# Patient Record
Sex: MALE | Race: WHITE | URBAN - METROPOLITAN AREA
[De-identification: names, ages, dates, MRNs, and addresses within clinical notes are randomized per-mention and may not be internally consistent; named-entity substitution may affect disease eponyms.]

---

## 2017-05-05 ENCOUNTER — APPOINTMENT (OUTPATIENT)
Dept: CT IMAGING | Facility: CLINIC | Age: 62
End: 2017-05-05
Attending: EMERGENCY MEDICINE
Payer: MEDICAID

## 2017-05-05 ENCOUNTER — ANESTHESIA EVENT (OUTPATIENT)
Dept: SURGERY | Facility: CLINIC | Age: 62
End: 2017-05-05
Payer: MEDICAID

## 2017-05-05 ENCOUNTER — HOSPITAL ENCOUNTER (INPATIENT)
Facility: CLINIC | Age: 62
LOS: 2 days | Discharge: LEFT BEFORE DISCHARGE | End: 2017-05-07
Attending: EMERGENCY MEDICINE | Admitting: INTERNAL MEDICINE
Payer: MEDICAID

## 2017-05-05 DIAGNOSIS — K81.0 ACUTE CHOLECYSTITIS: ICD-10-CM

## 2017-05-05 PROBLEM — K81.9 CHOLECYSTITIS: Status: ACTIVE | Noted: 2017-05-05

## 2017-05-05 LAB
ALBUMIN SERPL-MCNC: 4.3 G/DL (ref 3.4–5)
ALBUMIN UR-MCNC: NEGATIVE MG/DL
ALP SERPL-CCNC: 82 U/L (ref 40–150)
ALT SERPL W P-5'-P-CCNC: 24 U/L (ref 0–70)
ANION GAP SERPL CALCULATED.3IONS-SCNC: 9 MMOL/L (ref 3–14)
APPEARANCE UR: CLEAR
AST SERPL W P-5'-P-CCNC: 32 U/L (ref 0–45)
BASOPHILS # BLD AUTO: 0.1 10E9/L (ref 0–0.2)
BASOPHILS NFR BLD AUTO: 0.5 %
BILIRUB SERPL-MCNC: 0.9 MG/DL (ref 0.2–1.3)
BILIRUB UR QL STRIP: NEGATIVE
BUN SERPL-MCNC: 16 MG/DL (ref 7–30)
CALCIUM SERPL-MCNC: 9.5 MG/DL (ref 8.5–10.1)
CHLORIDE SERPL-SCNC: 105 MMOL/L (ref 94–109)
CO2 SERPL-SCNC: 25 MMOL/L (ref 20–32)
COLOR UR AUTO: YELLOW
CREAT SERPL-MCNC: 0.87 MG/DL (ref 0.66–1.25)
DIFFERENTIAL METHOD BLD: ABNORMAL
EOSINOPHIL # BLD AUTO: 0.1 10E9/L (ref 0–0.7)
EOSINOPHIL NFR BLD AUTO: 1.3 %
ERYTHROCYTE [DISTWIDTH] IN BLOOD BY AUTOMATED COUNT: 15.1 % (ref 10–15)
GFR SERPL CREATININE-BSD FRML MDRD: 89 ML/MIN/1.7M2
GLUCOSE SERPL-MCNC: 116 MG/DL (ref 70–99)
GLUCOSE UR STRIP-MCNC: NEGATIVE MG/DL
HCT VFR BLD AUTO: 47.6 % (ref 40–53)
HGB BLD-MCNC: 16.6 G/DL (ref 13.3–17.7)
HGB UR QL STRIP: NEGATIVE
IMM GRANULOCYTES # BLD: 0 10E9/L (ref 0–0.4)
IMM GRANULOCYTES NFR BLD: 0.2 %
INR PPP: 0.99 (ref 0.86–1.14)
INTERPRETATION ECG - MUSE: NORMAL
KETONES UR STRIP-MCNC: 40 MG/DL
LEUKOCYTE ESTERASE UR QL STRIP: NEGATIVE
LIPASE SERPL-CCNC: 143 U/L (ref 73–393)
LYMPHOCYTES # BLD AUTO: 1.7 10E9/L (ref 0.8–5.3)
LYMPHOCYTES NFR BLD AUTO: 18.1 %
MCH RBC QN AUTO: 31.1 PG (ref 26.5–33)
MCHC RBC AUTO-ENTMCNC: 34.9 G/DL (ref 31.5–36.5)
MCV RBC AUTO: 89 FL (ref 78–100)
MONOCYTES # BLD AUTO: 1.1 10E9/L (ref 0–1.3)
MONOCYTES NFR BLD AUTO: 11.5 %
MUCOUS THREADS #/AREA URNS LPF: PRESENT /LPF
NEUTROPHILS # BLD AUTO: 6.3 10E9/L (ref 1.6–8.3)
NEUTROPHILS NFR BLD AUTO: 68.4 %
NITRATE UR QL: NEGATIVE
NRBC # BLD AUTO: 0 10*3/UL
NRBC BLD AUTO-RTO: 0 /100
PH UR STRIP: 6 PH (ref 5–7)
PLATELET # BLD AUTO: 265 10E9/L (ref 150–450)
POTASSIUM SERPL-SCNC: 3.7 MMOL/L (ref 3.4–5.3)
PROT SERPL-MCNC: 8 G/DL (ref 6.8–8.8)
RBC # BLD AUTO: 5.34 10E12/L (ref 4.4–5.9)
RBC #/AREA URNS AUTO: 2 /HPF (ref 0–2)
SODIUM SERPL-SCNC: 139 MMOL/L (ref 133–144)
SP GR UR STRIP: 1.03 (ref 1–1.03)
TROPONIN I SERPL-MCNC: 0.03 UG/L (ref 0–0.04)
URN SPEC COLLECT METH UR: ABNORMAL
UROBILINOGEN UR STRIP-MCNC: NORMAL MG/DL (ref 0–2)
WBC # BLD AUTO: 9.3 10E9/L (ref 4–11)
WBC #/AREA URNS AUTO: 1 /HPF (ref 0–2)

## 2017-05-05 PROCEDURE — 96374 THER/PROPH/DIAG INJ IV PUSH: CPT

## 2017-05-05 PROCEDURE — 99222 1ST HOSP IP/OBS MODERATE 55: CPT | Mod: AI | Performed by: INTERNAL MEDICINE

## 2017-05-05 PROCEDURE — 12000000 ZZH R&B MED SURG/OB

## 2017-05-05 PROCEDURE — 99285 EMERGENCY DEPT VISIT HI MDM: CPT | Mod: 25

## 2017-05-05 PROCEDURE — 84484 ASSAY OF TROPONIN QUANT: CPT | Performed by: EMERGENCY MEDICINE

## 2017-05-05 PROCEDURE — 96375 TX/PRO/DX INJ NEW DRUG ADDON: CPT

## 2017-05-05 PROCEDURE — 93005 ELECTROCARDIOGRAM TRACING: CPT

## 2017-05-05 PROCEDURE — 99207 ZZC APP CREDIT; MD BILLING SHARED VISIT: CPT | Performed by: PHYSICIAN ASSISTANT

## 2017-05-05 PROCEDURE — 99221 1ST HOSP IP/OBS SF/LOW 40: CPT | Mod: 57 | Performed by: SURGERY

## 2017-05-05 PROCEDURE — 25000128 H RX IP 250 OP 636: Performed by: PHYSICIAN ASSISTANT

## 2017-05-05 PROCEDURE — 25000125 ZZHC RX 250: Performed by: EMERGENCY MEDICINE

## 2017-05-05 PROCEDURE — 74177 CT ABD & PELVIS W/CONTRAST: CPT

## 2017-05-05 PROCEDURE — 36415 COLL VENOUS BLD VENIPUNCTURE: CPT

## 2017-05-05 PROCEDURE — 25000128 H RX IP 250 OP 636: Performed by: EMERGENCY MEDICINE

## 2017-05-05 PROCEDURE — 25000132 ZZH RX MED GY IP 250 OP 250 PS 637: Performed by: PHYSICIAN ASSISTANT

## 2017-05-05 PROCEDURE — 25500064 ZZH RX 255 OP 636: Performed by: EMERGENCY MEDICINE

## 2017-05-05 PROCEDURE — 80053 COMPREHEN METABOLIC PANEL: CPT | Performed by: EMERGENCY MEDICINE

## 2017-05-05 PROCEDURE — 85025 COMPLETE CBC W/AUTO DIFF WBC: CPT | Performed by: EMERGENCY MEDICINE

## 2017-05-05 PROCEDURE — 83690 ASSAY OF LIPASE: CPT | Performed by: EMERGENCY MEDICINE

## 2017-05-05 PROCEDURE — 96376 TX/PRO/DX INJ SAME DRUG ADON: CPT

## 2017-05-05 PROCEDURE — 81001 URINALYSIS AUTO W/SCOPE: CPT | Performed by: EMERGENCY MEDICINE

## 2017-05-05 PROCEDURE — 85610 PROTHROMBIN TIME: CPT | Performed by: EMERGENCY MEDICINE

## 2017-05-05 PROCEDURE — 96361 HYDRATE IV INFUSION ADD-ON: CPT

## 2017-05-05 PROCEDURE — 87040 BLOOD CULTURE FOR BACTERIA: CPT | Performed by: EMERGENCY MEDICINE

## 2017-05-05 RX ORDER — MIRTAZAPINE 15 MG/1
30 TABLET, FILM COATED ORAL AT BEDTIME
Status: DISCONTINUED | OUTPATIENT
Start: 2017-05-05 | End: 2017-05-07 | Stop reason: HOSPADM

## 2017-05-05 RX ORDER — HYDROMORPHONE HYDROCHLORIDE 1 MG/ML
0.5 INJECTION, SOLUTION INTRAMUSCULAR; INTRAVENOUS; SUBCUTANEOUS
Status: COMPLETED | OUTPATIENT
Start: 2017-05-05 | End: 2017-05-05

## 2017-05-05 RX ORDER — AMOXICILLIN 250 MG
1-2 CAPSULE ORAL 2 TIMES DAILY PRN
Status: DISCONTINUED | OUTPATIENT
Start: 2017-05-05 | End: 2017-05-07 | Stop reason: HOSPADM

## 2017-05-05 RX ORDER — CLONAZEPAM 0.5 MG/1
1 TABLET ORAL AT BEDTIME
Status: DISCONTINUED | OUTPATIENT
Start: 2017-05-05 | End: 2017-05-07 | Stop reason: HOSPADM

## 2017-05-05 RX ORDER — POTASSIUM CHLORIDE 29.8 MG/ML
20 INJECTION INTRAVENOUS
Status: DISCONTINUED | OUTPATIENT
Start: 2017-05-05 | End: 2017-05-07 | Stop reason: HOSPADM

## 2017-05-05 RX ORDER — PROCHLORPERAZINE MALEATE 5 MG
5-10 TABLET ORAL EVERY 6 HOURS PRN
Status: DISCONTINUED | OUTPATIENT
Start: 2017-05-05 | End: 2017-05-07 | Stop reason: HOSPADM

## 2017-05-05 RX ORDER — ACETAMINOPHEN 325 MG/1
650 TABLET ORAL EVERY 4 HOURS PRN
Status: DISCONTINUED | OUTPATIENT
Start: 2017-05-05 | End: 2017-05-07 | Stop reason: HOSPADM

## 2017-05-05 RX ORDER — IOPAMIDOL 755 MG/ML
88 INJECTION, SOLUTION INTRAVASCULAR ONCE
Status: COMPLETED | OUTPATIENT
Start: 2017-05-05 | End: 2017-05-05

## 2017-05-05 RX ORDER — CLONAZEPAM 0.5 MG/1
0.5 TABLET ORAL EVERY MORNING
Status: DISCONTINUED | OUTPATIENT
Start: 2017-05-05 | End: 2017-05-07 | Stop reason: HOSPADM

## 2017-05-05 RX ORDER — PIPERACILLIN SODIUM, TAZOBACTAM SODIUM 3; .375 G/15ML; G/15ML
3.38 INJECTION, POWDER, LYOPHILIZED, FOR SOLUTION INTRAVENOUS EVERY 6 HOURS
Status: DISCONTINUED | OUTPATIENT
Start: 2017-05-05 | End: 2017-05-06

## 2017-05-05 RX ORDER — POTASSIUM CHLORIDE 1.5 G/1.58G
20-40 POWDER, FOR SOLUTION ORAL
Status: DISCONTINUED | OUTPATIENT
Start: 2017-05-05 | End: 2017-05-07 | Stop reason: HOSPADM

## 2017-05-05 RX ORDER — DULOXETIN HYDROCHLORIDE 60 MG/1
60 CAPSULE, DELAYED RELEASE ORAL DAILY
Status: DISCONTINUED | OUTPATIENT
Start: 2017-05-05 | End: 2017-05-07 | Stop reason: HOSPADM

## 2017-05-05 RX ORDER — ONDANSETRON 2 MG/ML
4 INJECTION INTRAMUSCULAR; INTRAVENOUS ONCE
Status: COMPLETED | OUTPATIENT
Start: 2017-05-05 | End: 2017-05-05

## 2017-05-05 RX ORDER — PRAMIPEXOLE 0.38 MG/1
TABLET, EXTENDED RELEASE ORAL AT BEDTIME
Status: ON HOLD | COMMUNITY
End: 2017-05-05

## 2017-05-05 RX ORDER — ONDANSETRON 2 MG/ML
4 INJECTION INTRAMUSCULAR; INTRAVENOUS EVERY 6 HOURS PRN
Status: DISCONTINUED | OUTPATIENT
Start: 2017-05-05 | End: 2017-05-07 | Stop reason: HOSPADM

## 2017-05-05 RX ORDER — CALCIUM CARBONATE 500 MG/1
500-1000 TABLET, CHEWABLE ORAL 4 TIMES DAILY PRN
Status: DISCONTINUED | OUTPATIENT
Start: 2017-05-05 | End: 2017-05-07 | Stop reason: HOSPADM

## 2017-05-05 RX ORDER — CARVEDILOL 3.12 MG/1
3.12 TABLET ORAL EVERY MORNING
Status: DISCONTINUED | OUTPATIENT
Start: 2017-05-05 | End: 2017-05-07 | Stop reason: HOSPADM

## 2017-05-05 RX ORDER — OXYCODONE HYDROCHLORIDE 5 MG/1
5-10 TABLET ORAL
Status: DISCONTINUED | OUTPATIENT
Start: 2017-05-05 | End: 2017-05-07 | Stop reason: HOSPADM

## 2017-05-05 RX ORDER — PROCHLORPERAZINE 25 MG
25 SUPPOSITORY, RECTAL RECTAL EVERY 12 HOURS PRN
Status: DISCONTINUED | OUTPATIENT
Start: 2017-05-05 | End: 2017-05-07 | Stop reason: HOSPADM

## 2017-05-05 RX ORDER — POTASSIUM CL/LIDO/0.9 % NACL 10MEQ/0.1L
10 INTRAVENOUS SOLUTION, PIGGYBACK (ML) INTRAVENOUS
Status: DISCONTINUED | OUTPATIENT
Start: 2017-05-05 | End: 2017-05-07 | Stop reason: HOSPADM

## 2017-05-05 RX ORDER — LIDOCAINE 40 MG/G
CREAM TOPICAL
Status: DISCONTINUED | OUTPATIENT
Start: 2017-05-05 | End: 2017-05-07 | Stop reason: HOSPADM

## 2017-05-05 RX ORDER — PRAMIPEXOLE DIHYDROCHLORIDE 0.5 MG/1
0.5 TABLET ORAL AT BEDTIME
Status: DISCONTINUED | OUTPATIENT
Start: 2017-05-05 | End: 2017-05-07 | Stop reason: HOSPADM

## 2017-05-05 RX ORDER — POTASSIUM CHLORIDE 1500 MG/1
20-40 TABLET, EXTENDED RELEASE ORAL
Status: DISCONTINUED | OUTPATIENT
Start: 2017-05-05 | End: 2017-05-07 | Stop reason: HOSPADM

## 2017-05-05 RX ORDER — NALOXONE HYDROCHLORIDE 0.4 MG/ML
.1-.4 INJECTION, SOLUTION INTRAMUSCULAR; INTRAVENOUS; SUBCUTANEOUS
Status: DISCONTINUED | OUTPATIENT
Start: 2017-05-05 | End: 2017-05-07 | Stop reason: HOSPADM

## 2017-05-05 RX ORDER — HYDROMORPHONE HYDROCHLORIDE 1 MG/ML
.3-.5 INJECTION, SOLUTION INTRAMUSCULAR; INTRAVENOUS; SUBCUTANEOUS
Status: DISCONTINUED | OUTPATIENT
Start: 2017-05-05 | End: 2017-05-07 | Stop reason: HOSPADM

## 2017-05-05 RX ORDER — SODIUM CHLORIDE 9 MG/ML
INJECTION, SOLUTION INTRAVENOUS CONTINUOUS
Status: DISCONTINUED | OUTPATIENT
Start: 2017-05-05 | End: 2017-05-06

## 2017-05-05 RX ORDER — BUSPIRONE HYDROCHLORIDE 10 MG/1
10 TABLET ORAL AT BEDTIME
Status: DISCONTINUED | OUTPATIENT
Start: 2017-05-05 | End: 2017-05-07 | Stop reason: HOSPADM

## 2017-05-05 RX ORDER — PIPERACILLIN SODIUM, TAZOBACTAM SODIUM 3; .375 G/15ML; G/15ML
3.38 INJECTION, POWDER, LYOPHILIZED, FOR SOLUTION INTRAVENOUS ONCE
Status: COMPLETED | OUTPATIENT
Start: 2017-05-05 | End: 2017-05-05

## 2017-05-05 RX ORDER — ONDANSETRON 4 MG/1
4 TABLET, ORALLY DISINTEGRATING ORAL EVERY 6 HOURS PRN
Status: DISCONTINUED | OUTPATIENT
Start: 2017-05-05 | End: 2017-05-07 | Stop reason: HOSPADM

## 2017-05-05 RX ORDER — LANOLIN ALCOHOL/MO/W.PET/CERES
3 CREAM (GRAM) TOPICAL AT BEDTIME
Status: DISCONTINUED | OUTPATIENT
Start: 2017-05-05 | End: 2017-05-07 | Stop reason: HOSPADM

## 2017-05-05 RX ORDER — POTASSIUM CHLORIDE 7.45 MG/ML
10 INJECTION INTRAVENOUS
Status: DISCONTINUED | OUTPATIENT
Start: 2017-05-05 | End: 2017-05-07 | Stop reason: HOSPADM

## 2017-05-05 RX ADMIN — CLONAZEPAM 0.5 MG: 0.5 TABLET ORAL at 14:35

## 2017-05-05 RX ADMIN — PIPERACILLIN SODIUM,TAZOBACTAM SODIUM 3.38 G: 3; .375 INJECTION, POWDER, FOR SOLUTION INTRAVENOUS at 20:48

## 2017-05-05 RX ADMIN — ONDANSETRON 4 MG: 2 SOLUTION INTRAMUSCULAR; INTRAVENOUS at 07:05

## 2017-05-05 RX ADMIN — SODIUM CHLORIDE 1000 ML: 9 INJECTION, SOLUTION INTRAVENOUS at 07:00

## 2017-05-05 RX ADMIN — SODIUM CHLORIDE: 9 INJECTION, SOLUTION INTRAVENOUS at 20:48

## 2017-05-05 RX ADMIN — ONDANSETRON 4 MG: 2 SOLUTION INTRAMUSCULAR; INTRAVENOUS at 07:30

## 2017-05-05 RX ADMIN — HYDROMORPHONE HYDROCHLORIDE 0.5 MG: 1 INJECTION, SOLUTION INTRAMUSCULAR; INTRAVENOUS; SUBCUTANEOUS at 12:33

## 2017-05-05 RX ADMIN — MIRTAZAPINE 30 MG: 15 TABLET, FILM COATED ORAL at 22:25

## 2017-05-05 RX ADMIN — HYDROMORPHONE HYDROCHLORIDE 0.5 MG: 1 INJECTION, SOLUTION INTRAMUSCULAR; INTRAVENOUS; SUBCUTANEOUS at 08:40

## 2017-05-05 RX ADMIN — DULOXETINE HYDROCHLORIDE 60 MG: 60 CAPSULE, DELAYED RELEASE ORAL at 14:35

## 2017-05-05 RX ADMIN — IOPAMIDOL 88 ML: 755 INJECTION, SOLUTION INTRAVENOUS at 08:20

## 2017-05-05 RX ADMIN — PIPERACILLIN SODIUM,TAZOBACTAM SODIUM 3.38 G: 3; .375 INJECTION, POWDER, FOR SOLUTION INTRAVENOUS at 09:08

## 2017-05-05 RX ADMIN — PROCHLORPERAZINE EDISYLATE 10 MG: 5 INJECTION INTRAMUSCULAR; INTRAVENOUS at 10:24

## 2017-05-05 RX ADMIN — HYDROMORPHONE HYDROCHLORIDE 0.5 MG: 1 INJECTION, SOLUTION INTRAMUSCULAR; INTRAVENOUS; SUBCUTANEOUS at 10:23

## 2017-05-05 RX ADMIN — BUSPIRONE HYDROCHLORIDE 10 MG: 10 TABLET ORAL at 22:25

## 2017-05-05 RX ADMIN — SODIUM CHLORIDE 67 ML: 9 INJECTION, SOLUTION INTRAVENOUS at 08:20

## 2017-05-05 RX ADMIN — PIPERACILLIN SODIUM,TAZOBACTAM SODIUM 3.38 G: 3; .375 INJECTION, POWDER, FOR SOLUTION INTRAVENOUS at 14:35

## 2017-05-05 RX ADMIN — HYDROMORPHONE HYDROCHLORIDE 0.5 MG: 1 INJECTION, SOLUTION INTRAMUSCULAR; INTRAVENOUS; SUBCUTANEOUS at 14:34

## 2017-05-05 RX ADMIN — HYDROMORPHONE HYDROCHLORIDE 0.5 MG: 1 INJECTION, SOLUTION INTRAMUSCULAR; INTRAVENOUS; SUBCUTANEOUS at 07:05

## 2017-05-05 RX ADMIN — SODIUM CHLORIDE: 9 INJECTION, SOLUTION INTRAVENOUS at 10:23

## 2017-05-05 RX ADMIN — HYDROMORPHONE HYDROCHLORIDE 0.5 MG: 1 INJECTION, SOLUTION INTRAMUSCULAR; INTRAVENOUS; SUBCUTANEOUS at 07:32

## 2017-05-05 RX ADMIN — PRAMIPEXOLE DIHYDROCHLORIDE 0.5 MG: 0.5 TABLET ORAL at 22:25

## 2017-05-05 RX ADMIN — MELATONIN 3 MG: 3 TAB ORAL at 22:25

## 2017-05-05 RX ADMIN — HYDROMORPHONE HYDROCHLORIDE 0.5 MG: 1 INJECTION, SOLUTION INTRAMUSCULAR; INTRAVENOUS; SUBCUTANEOUS at 22:28

## 2017-05-05 RX ADMIN — CLONAZEPAM 1 MG: 0.5 TABLET ORAL at 22:25

## 2017-05-05 RX ADMIN — HYDROMORPHONE HYDROCHLORIDE 0.5 MG: 1 INJECTION, SOLUTION INTRAMUSCULAR; INTRAVENOUS; SUBCUTANEOUS at 19:36

## 2017-05-05 ASSESSMENT — ACTIVITIES OF DAILY LIVING (ADL)
TRANSFERRING: 0-->INDEPENDENT
RETIRED_COMMUNICATION: 0-->UNDERSTANDS/COMMUNICATES WITHOUT DIFFICULTY
DRESS: 0-->INDEPENDENT
FALL_HISTORY_WITHIN_LAST_SIX_MONTHS: NO
AMBULATION: 1-->ASSISTIVE EQUIPMENT
RETIRED_EATING: 0-->INDEPENDENT
BATHING: 0-->INDEPENDENT
TOILETING: 0-->INDEPENDENT
SWALLOWING: 0-->SWALLOWS FOODS/LIQUIDS WITHOUT DIFFICULTY
COGNITION: 0 - NO COGNITION ISSUES REPORTED

## 2017-05-05 ASSESSMENT — ENCOUNTER SYMPTOMS
NAUSEA: 1
ABDOMINAL PAIN: 1
BLOOD IN STOOL: 0
LIGHT-HEADEDNESS: 1

## 2017-05-05 NOTE — ED PROVIDER NOTES
"  History     Chief Complaint:  RUQ pain    HPI   Oswaldo Daniels is a 62 year old male smoker with a history of gallstones, hypertension, multiple myocardial infarctions and CAD status post cardiac stents x5 on Plavix and Eliquis who presents to the ED for evaluation of right upper quadrant abdominal pain and nausea. The patient reports he presented to [facility] in [place] two weeks ago for RUQ and nausea and was diagnosed with gallstones, as seen on ultrasound. He was discharged with pain and nausea medications. He states he is due to see a surgeon in [place] next week for consultation. He is here in [place] this weekend visiting his son. He reports he was watching TV last night when he suddenly felt nauseated and his RUQ pain returned. He rates his pain a 9/10. He mentions feeling a little light headed. His last BM was at midnight and he denies blood in his stool. He also denies any dysuria.    Allergies:  No known drug allergies     Medications:    Eliquis  Plavix  Melatonin  Carvedilol     Past Medical History:    Gallstones  MI  CAD  Anxiety  Depression  PTSD  Hypertension  Neuromuscular disorder  CKD    Past Surgical History:    Cardiac stents x 5    Family History:    No past pertinent family history.    Social History:  The patient was accompanied to the ED by his son.  Smoking Status: Current every day smoker- 1.00 pack daily  Alcohol Use: Yes    Review of Systems   Gastrointestinal: Positive for abdominal pain (RUQ) and nausea. Negative for blood in stool.   Neurological: Positive for light-headedness.   All other systems reviewed and are negative.    Physical Exam   First Vitals:  BP: (!) 150/96  Pulse: 115  Temp: 98  F (36.7  C)  Resp: 18  Height: 177.8 cm (5' 10\")  Weight: 78.9 kg (174 lb)  SpO2: 98 %      Physical Exam  Nursing note and vitals reviewed.  Constitutional:  Appears well-developed and well-nourished. He smells heavily of cigarette smoke.  HENT:   Head:    Atraumatic. "   Mouth/Throat:   Oropharynx is clear and moist. No oropharyngeal exudate.   Eyes:    Pupils are equal, round, and reactive to light.   Neck:    Normal range of motion. Neck supple.      No tracheal deviation present. No thyromegaly present.   Cardiovascular:  Normal rate, regular rhythm, no murmur   Pulmonary/Chest: Slightly labored breathing. No wheezes or crackles.  Abdominal:   RUQ tenderness with guarding, no rebound.     Soft. Bowel sounds are normal. Exhibits no distension and      no mass.     Musculoskeletal:  Exhibits no edema.   Lymphadenopathy:  No cervical adenopathy.   Neurological:   Alert and oriented to person, place, and time.   Skin:    Skin is warm and dry. No rash noted. No pallor.     Emergency Department Course   ECG done at 6:56. ECG read at 7:02. Indication: Lightheaded  Rate 103 bpm. NY interval 150. QRS duration 90. QT/QTc 370/484. P-R-T axes 82 -61 66.  Sinus tachycardia.  Left axis deviation.  Septal infarct age undetermined.  Abnormal ECG.    Imaging:  Radiographic findings were communicated with the patient who voiced understanding of the findings.    CT Abdomen Pelvis w Contrast:  IMPRESSION:  1. 1.5 cm gallbladder neck stone.  2. Right inguinal hernia containing a small portion of the cecum.  There is no adjacent inflammatory stranding.  3. No other evidence of an acute inflammatory process in the abdomen  or pelvis.  4. Hepatic fatty infiltration, possible etiologies include consumption  of alcohol or excessive carbohydrate intake, especially  sugar/fructose. Metabolic syndrome commonly occurs in combination with  nonalcoholic fatty liver disease. Although often reversible,  nonalcoholic fatty liver disease can progress to steatohepatitis and  cirrhosis.   Per Radiology.    Laboratory:  CBC: WBC 9.3, HGB 16.6,   CMP: Glucose 116 o/w WNL (Creat 0.87)    Lipase: 143  Troponin I: 0.033  INR: 0.99    Blood cultures: pending    UA: Mucous Present, Ketone 40(A) o/w  Negative    Interventions:  0640 Dilaudid 0.5mg Injection    0640 NS Bolus IV    0730 Zofran 4mg Injection    0903 Zosyn 3.375g vial to attach to NS 100mL bag IV    Emergency Department Course:  Nursing notes and vitals reviewed.  I performed an exam of the patient as documented above.     0848 I spoke to the patient about his CT imaging results.    0857 I spoke to Dr. Wahl in General Surgery regarding the patient's history, CT imaging and work up here in the ED. He will wait a couple days to perform the surgery, as the patient is anticoagulated on both Eliquis and Plavix.     Findings and plan explained to the patient who consents to admission. Discussed the patient with Dr. Kumar, who will admit the patient to a surgical bed for further monitoring, evaluation, and treatment.    Impression & Plan    Medical Decision Making:  Oswaldo Daniels is a 62 year old male who I found to have symptoms consistent with acute cholecystitis and a large gallstone impacted in the neck of the gallbladder, making him at risk for rapid worsening of cholecystitis. The patient was seen at [facility] in [place] a couple weeks ago for right upper quadrant pain and nausea and was found to have gallstones on ultrasound. He comes in today because his RUQ pain became severe last night. CT imaging shows a 1.5cm gallbladder neck stone. I discussed the patient with Dr. Wahl of General Surgery. Because the patient is anticoagulated on both Eliquis and Plavix due to his cardiac history, Dr. Wahl will have to wait a couple of days to perform the surgery and the patient will need to discontinue usage of these medications prior to surgery. The patient later reported that he has not taken Plavix for 2 weeks because he ran out of it, but did take his Eliquis yesterday morning.  I then spoke to the hospitalist Dr. Kumar, who agreed to accept the patient and will admit him to a surgical bed for further monitoring, evaluation and  treatment prior to surgery. The patient here was given Dilaudid for pain control, Zofran for nausea, a normal saline bolus for hydration and he was started on IV Zosyn antibiotic for cholecystitis. Of note, I also discussed the incidental findings on the patient's CT with him, including hepatic fatty infiltration of the liver and a right inguinal hernia. He was instructed to follow up on these findings with his primary care physician. I also encouraged the patient to quit smoking, however, he expressed he did not want to think about that at this time.     Diagnosis:  (K81.0) Acute cholecystitis    Disposition:  The patient will be admitted to the hospital.    5/5/2017    EMERGENCY DEPARTMENT    I, Joanne Moore, am serving as a scribe at 6:33 on May 5, 2017  to document services personally performed by Dr. Luevano, based on my observations and the provider's statements to me.       Dena Luevano MD  05/05/17 0937

## 2017-05-05 NOTE — ED NOTES
"[facility]  ED Nurse Handoff Report    ED Chief complaint: Abdominal Pain (RUQ pain; hx gallstones)      ED Diagnosis:   Final diagnoses:   None       Code Status: Full Code    Allergies: No Known Allergies    Activity level - Baseline/Home:  Independent    Activity Level - Current:   Stand with Assist     Needed?: No    Isolation: No  Infection: Not Applicable    Bariatric?: No    Vital Signs:   Vitals:    05/05/17 0626 05/05/17 0730 05/05/17 0735 05/05/17 0800   BP: (!) 150/96 (!) 155/109 140/90 (!) 146/91   Pulse: 115  101    Resp: 18  16    Temp: 98  F (36.7  C)      TempSrc: Oral      SpO2: 98% 98% 97% 98%   Weight: 78.9 kg (174 lb)      Height: 1.778 m (5' 10\")          Cardiac Rhythm: ,        Pain level: 0-10 Pain Scale: 7    Is this patient confused?: No    Patient Report: Initial Complaint: right upper quadrant pain  Focused Assessment: Patient came in this morning with severe right upper quadrant pain. He was apparently seen for this same problem a few weeks ago and pain did resolve but came back last night along with nausea and so he came in this morning. He is found to have a large gall bladder neck stone and will be having surgical removal of gall bladder. He is on Plavix and Xarelto, last doses were 5/4/2017 at 0800. He has had a few ice chips and oral contrast, last solid food intake was 5/4/2017 at 2300.   Tests Performed: labs, urine, blood cultures x 2, abdominal cat scan  Abnormal Results: see epic  Treatments provided: Zofran x 2 for nausea, Hydromorphone 0.5mg x 3 doses for pain, 1 liter fluids and IV antibiotic    Family Comments: son is at bedside    OBS brochure/video discussed/provided to patient: No    ED Medications:   Medications   0.9% sodium chloride BOLUS (0 mLs Intravenous Stopped 5/5/17 0817)   HYDROmorphone (PF) (DILAUDID) injection 0.5 mg (0.5 mg Intravenous Given 5/5/17 0840)   ondansetron (ZOFRAN) injection 4 mg (4 mg Intravenous Given 5/5/17 0705) "   iohexol (OMNIPAQUE) solution 50 mL (50 mLs Oral Given 5/5/17 0793)   ondansetron (ZOFRAN) injection 4 mg (4 mg Intravenous Given 5/5/17 0730)   sodium chloride 0.9 % for CT scan flush dose 67 mL (67 mLs Intravenous Given 5/5/17 0820)   iopamidol (ISOVUE-370) solution 88 mL (88 mLs Intravenous Given 5/5/17 0820)       Drips infusing?:  No      ED NURSE PHONE NUMBER: *[phone]

## 2017-05-05 NOTE — PLAN OF CARE
Problem: Goal Outcome Summary  Goal: Goal Outcome Summary  Outcome: No Change  Pt admitted to the 6th floor.   C/o pain rates 7/10 given dilaudid x3 with some improvement.  C/o nausea and emesis given compazine with relief.  Tele- SR.  Awaiting surg consult.  Tolerating clears.  Resting between cares.

## 2017-05-05 NOTE — CONSULTS
[facility]  General Surgery Consultation         Felcie Wahl    Oswaldo Daniels MRN# [id number]   YOB: 1955 Age: 62 year old      Date of Admission:  5/5/2017  Date of Consult: 5/5/2017         Assessment and Plan:   Patient is a 62 year old male with acute cholecystitis    PLAN:  I have personally reviewed all imaging and laboratory studies. The patient has known cholelithiasis and presents with a stone lodged in the neck. He has had increased symptoms and had signs compatible with acute cholecystitis. He is on multiple blood thinners but has only been taking his Eliquis which was last taken yesterday. I would recommend admission, IV hydration, antibiotics, holding anticoagulations, and will plan for laparoscopic cholecystectomy in 1-2 days. I also discussed the risks associated with the procedure including, but not limited to infection, bleeding, conversion to open, bile leak, bile duct injury, retained gallstones, pneumonia, MI, and anesthesia complications with the patient.  I also discussed if a complication did occur it may require further surgical intervention during or after the procedure. The patient indicated understanding of the discussion, asked appropriate questions, and provided consent         Requesting Physician:      Dr. jimenez        Chief Complaint:     Chief Complaint   Patient presents with     Abdominal Pain     RUQ pain; hx gallstones          History of Present Illness:   Patient is a 62 year old male who present for evaluation of cholecystitis.The patient describes having symptoms for the last 2 days. The pain is mainly located in the RUQ area. The patient rates the pain a 7 out of 10. The pain is exacerbated by oral intake. The pain is relieved by fasting.  He has known cholelithiasis and was supposed to see a surgeon but never followed up. He came in with more pain in the sound of a stone lodged in the neck. His pain was unable to be controlled  "in the emergency room so he was admitted per the medical team. He is on Plavix which she has not taken as well as Eliquis which was taken one day ago in the a.m.. Patient denies fevers, chills, nausea, vomiting, jaundice, changes in stool or urine, headache, SOB, chest pain.          Physical Exam:   Blood pressure 156/88, pulse 110, temperature 97.9  F (36.6  C), temperature source Oral, resp. rate 14, height 5' 10\" (1.778 m), weight 174 lb (78.9 kg), SpO2 95 %.  174 lbs 0 oz  General: Generally appears well.  Psych: Alert and Oriented.  Normal affect  Neurological: grossly intact  Eyes: Sclera clear  Respiratory:  Lungs clear to ausculation bilaterally with good air excursion  Cardiovascular:  Regular Rate and Rhythm with no murmurs gallops or rubs, normal peripheral pulses  GI: Abdomen Soft Moderate tenderness to palpation RUQ No hernias palpated..  Lymphatic/Hematologic/Immune:  No femoral or cervical lymphadenopathy.  Integumentary:  No rashes       Past Medical History:     Past Medical History:   Diagnosis Date     Anxiety      Chronic kidney disease      Coronary artery disease      Depressive disorder      Gallstones      Hypertension      Myocardial infarction (H)      Neuromuscular disorder (H)     neuropathy in legs     Paroxysmal atrial fibrillation (H)      PTSD (post-traumatic stress disorder)      Tobacco use disorder           Past Surgical History:     Past Surgical History:   Procedure Laterality Date     CARDIAC SURGERY      stents x5          Current Medications:           sodium chloride (PF)  3 mL Intracatheter Q8H     piperacillin-tazobactam  3.375 g Intravenous Q6H     DULoxetine (CYMBALTA) EC capsule 60 mg  60 mg Oral Daily     melatonin tablet 3 mg  3 mg Oral At Bedtime     mirtazapine (REMERON) tablet 30 mg  30 mg Oral At Bedtime     pramipexole  0.5 mg Oral At Bedtime     clonazePAM (klonoPIN) tablet 1 mg  1 mg Oral At Bedtime     clonazePAM (klonoPIN) tablet 0.5 mg  0.5 mg Oral QAM     " carvedilol (COREG) tablet 3.125 mg  3.125 mg Oral QAM     busPIRone  10 mg Oral At Bedtime     naloxone, lidocaine, lidocaine 4%, sodium chloride (PF), acetaminophen, oxyCODONE, HYDROmorphone, senna-docusate, ondansetron **OR** ondansetron, prochlorperazine **OR** prochlorperazine **OR** prochlorperazine, calcium carbonate, potassium chloride, potassium chloride, potassium chloride, potassium chloride with lidocaine, potassium chloride       Home Medications:     Prior to Admission medications    Medication Sig Last Dose Taking? Auth Provider   Clopidogrel Bisulfate (PLAVIX PO) Take 75 mg by mouth daily  4/21/2017 at Unknown time Yes Reported, Patient   Apixaban (ELIQUIS PO) Take 5 mg by mouth 2 times daily  5/4/2017 at am Yes Reported, Patient   MELATONIN PO Take 3 mg by mouth At Bedtime   Yes Reported, Patient   CARVEDILOL PO Take 3.125 mg by mouth every morning Rx is for 3.125mg BID 5/4/2017 at Unknown time Yes Reported, Patient   DULOXETINE HCL PO Take 60 mg by mouth daily 5/4/2017 at Unknown time Yes Unknown, Entered By History   CLONAZEPAM PO Take 0.5 mg by mouth every morning 5/4/2017 at Unknown time Yes Unknown, Entered By History   CLONAZEPAM PO Take 1 mg by mouth At Bedtime 5/3/2017 at Unknown time Yes Unknown, Entered By History   BUSPIRONE HCL PO Take 10 mg by mouth At Bedtime  5/3/2017 at Unknown time Yes Unknown, Entered By History   MIRTAZAPINE PO Take 30 mg by mouth At Bedtime 5/3/2017 at Unknown time Yes Unknown, Entered By History   Pramipexole Dihydrochloride (MIRAPEX PO) Take 0.5 mg by mouth At Bedtime  Yes Unknown, Entered By History          Allergies:   No Known Allergies       Family History:   Reviewed and noncontributory.        Social History:   Oswaldo Daniels  reports that he has been smoking.  He has been smoking about 1.00 pack per day. He does not have any smokeless tobacco history on file. He reports that he drinks about 1.2 oz of alcohol per week  He reports that he uses illicit  drugs, including Marijuana.        Review of Systems:   The 10 point Review of Systems is negative other than noted in the HPI.       Labs/Imaging   All new lab and imaging data was reviewed.       Felice Wahl M.D.  [facility]

## 2017-05-05 NOTE — IP AVS SNAPSHOT
[facility] [facility]    [address] [place] [address]    Phone:  [phone]                                       After Visit Summary   5/5/2017    Oswaldo Daniels    MRN: [id number]           After Visit Summary Signature Page     I have received my discharge instructions, and my questions have been answered. I have discussed any challenges I see with this plan with the nurse or doctor.    ..........................................................................................................................................  Patient/Patient Representative Signature      ..........................................................................................................................................  Patient Representative Print Name and Relationship to Patient    ..................................................               ................................................  Date                                            Time    ..........................................................................................................................................  Reviewed by Signature/Title    ...................................................              ..............................................  Date                                                            Time

## 2017-05-05 NOTE — H&P
DATE OF ADMISSION:  05/05/2017      CHIEF COMPLAINT:  Right upper quadrant pain and nausea.      HISTORY OF PRESENT ILLNESS:  Oswaldo Daniels is a pleasant 62-year-old male with a past medical history of hypertension, hyperlipidemia, coronary artery disease, status post stents x5, tobacco use, chronic kidney disease and neuromuscular disorder who presented to [facility] Emergency Department with complaints of right upper quadrant pain and nausea.  The patient's symptoms began last night after dinner.  He rates the pain as severe and nearly constant in the right upper quadrant.  He was nauseous and has vomited multiple times and was unable to keep food or liquids down.  He was seen recently in [facility] 2 weeks ago for right upper quadrant pain and nausea similar to this episode.  He was diagnosed with gallstones as seen on ultrasound.  He was discharged with pain medication and was referred to see a surgeon in [place] next week for consultation.  The patient reports some intermittent lightheadedness but denies any other symptoms such as fevers, chills, diaphoresis, headache, chest pain, shortness of breath, diarrhea, dysuria or focal weakness.      In the Emergency Department patient was evaluated by Dr. Luevano.  There he was found to have a blood pressure 150/96 with heart rate of 115 and temperature 98.  His lab work including a CMP and CBC were unremarkable.  He had a normal white blood cell count of 9.3 and hemoglobin of 16.6.  His LFTs were all within normal limits and lipase was 143.  His troponin was detectable at 0.33.  Urinalysis was at 40 ketones and mucus but otherwise negative.  Blood cultures were obtained.  He had an EKG that showed sinus tachycardia with no ischemic changes.  CT of abdomen and pelvis showed a 1.5 cm gallbladder neck stone.  Incidentally there was a right inguinal hernia and no other evidence of acute inflammatory process within the abdomen.  The  patient was given IV Dilaudid, Zofran and fluids and is feeling somewhat improved.  Dr. Wahl of General Surgery has been contacted.  The patient will be admitted to the hospital for further cares.      Of note the patient has had multiple heart attacks and angioplasties.  In  he had a stent placed.  Again in  he had another stent placed.  In 2016 he states he had 3 stents placed.  He states that this was done at [facility].  He was in [place] in 2017 where he presented to the hospital with chest pain and underwent an angiogram.  He was told that lesions were not amenable to stenting or angioplasty, and he was started on a statin.  He normally takes Plavix but states that he ran out 2 weeks ago and had been unable to obtain more.  He is also on Eliquis for history of paroxysmal atrial fibrillation.      PAST MEDICAL HISTORY:   1.  Gallstones.   2.  History of MI with stents x5.   3.  Coronary artery disease.   4.  Generalized anxiety.   5.  Depressive disorder.   6.  PTSD.   7.  Essential hypertension.   8.  Chronic kidney disease.   9.  Neuromuscular disorder.      PAST SURGICAL HISTORY:  Cardiac stents.      FAMILY HISTORY:  The patient reports multiple family members including his father and brother with heart disease.  His brother  of a heart attack in his 70s.  He denies any other pertinent family history.      SOCIAL HISTORY:  The patient is accompanied to the Emergency Department by his son.  He is a 1 pack a day smoker and has done so for the last 40 years.  He smokes marijuana occasionally.  He drinks alcohol, only a couple beverages weekly.  No other illicit drug use.      PRIOR TO ADMISSION MEDICATIONS:    Prior to Admission medications    Medication Sig Last Dose Taking? Auth Provider   Clopidogrel Bisulfate (PLAVIX PO) Take 75 mg by mouth daily  2017 at Unknown time Yes Reported, Patient   Apixaban (ELIQUIS PO) Take 5 mg by mouth 2 times daily  2017 at am Yes  Reported, Patient   MELATONIN PO Take by mouth At Bedtime   Yes Reported, Patient   CARVEDILOL PO Take 3.125 mg by mouth every morning  5/4/2017 at Unknown time Yes Reported, Patient   DULOXETINE HCL PO Take 60 mg by mouth daily 5/4/2017 at Unknown time Yes Unknown, Entered By History   pramipexole dihydrochloride (MIRAPEX) 0.375 MG TB24 24 hr tablet Take by mouth At Bedtime 5/3/2017 at Unknown time Yes Unknown, Entered By History   CLONAZEPAM PO Take 0.5 mg by mouth every morning 5/4/2017 at Unknown time Yes Unknown, Entered By History   CLONAZEPAM PO Take 1 mg by mouth At Bedtime 5/3/2017 at Unknown time Yes Unknown, Entered By History   BUSPIRONE HCL PO Take 5 mg by mouth At Bedtime 5/3/2017 at Unknown time Yes Unknown, Entered By History   MIRTAZAPINE PO Take 30 mg by mouth At Bedtime 5/3/2017 at Unknown time Yes Unknown, Entered By History     ALLERGIES:  No known drug allergies.      REVIEW OF SYSTEMS:  A complete 10-point review of systems was performed and is negative other than the items previously mentioned above HPI.      PHYSICAL EXAMINATION:   VITAL SIGNS:  Blood pressure 150/96, heart rate 101 beats minute, temperature 98, respiration rate 18, oxygen saturation 98% room air.   GENERAL:  The patient is alert, oriented to person, place, date and situation, cooperative, lying in bed in no apparent distress.   HEENT:  Pupils equal and round.  Extraocular movements intact.  Sclerae are nonicteric.   HEENT:  Head normocephalic.  Throat, lips, mucosa and tongue appear moist.   NECK:  Supple.   CARDIOVASCULAR:  Heart regular rate and rhythm.  No murmurs, rubs or gallop.  Distal pulses are intact.  No edema.   PULMONARY:  Lungs clear to auscultation bilaterally.  No crackles, wheezes or rhonchi.  Breathing is nonlabored.   GASTROINTESTINAL:  Abdomen soft, tender in the right upper quadrant.  Marie sign positive.  There is no rebound or guarding.  Bowel sounds are normoactive.   MUSCULOSKELETAL:  The patient  moves all 4 extremities equally with normal strength.   NEUROLOGIC:  Alert and oriented.  Cranial nerves II-XII are intact and symmetric.  Motor function is grossly intact.  No focal deficits.   SKIN:  Cool, dry, not diaphoretic.  No rashes.   PSYCHIATRIC:  Normal mood and affect.      LABORATORY DATA:  CMP with potassium 3.7, creatinine 0.87, glucose 116, all within normal limits otherwise.  Troponin 0.033, lipase 143.  CBC:  WBC 9.3, hemoglobin 16.6, hematocrit 47.6 and platelet count 265.  Blood cultures obtained.  INR 0.99.  Urinalysis with 40 ketones and mucus present, otherwise normal.      EKG:  Sinus tachycardia.      IMAGING:  CT abdomen and pelvis:  1.5 cm gallbladder neck stone.  Right inguinal hernia containing small portion of the cecum.  There is no adjacent inflammatory stranding.  No other evidence of an acute inflammatory process in the abdomen or pelvis.  Hepatic fatty infiltration.  Reading per Radiology.      ASSESSMENT AND PLAN:  Oswaldo Daniels is a pleasant 62-year-old male with a past medical history of gallstones, coronary artery disease, multiple myocardial infarctions with stents, hypertension and chronic kidney disease who presented to the Emergency Department with complaints of right upper quadrant pain and nausea.  He is found to have a gallstone in the neck of the gallbladder and thus is being admitted to the hospital for further treatment of cholecystitis.     1.  Acute cholecystitis.  The patient had onset of right upper quadrant pain and nausea beginning yesterday evening.  His pain was severe.  He has known gallstones and had been admitted at an outside facility approximately 2 weeks ago where this was seen on his ultrasound, and he was referred to surgery but has not yet followed up.  CT scan here shows a 1.5 cm gallbladder neck stone.  The patient's LFTs are within normal limits as is his lipase level.  His white blood cell count is normal.  He is afebrile.  He is mildly  tachycardic.  Will consult General Surgery.  Will have IV Zosyn.  Will start normal saline IV fluids.  Will provide pain medications and antiemetics.  Of note patient states he has been off Plavix for approximately 2 weeks as he ran out.  His last dose of Eliquis was the morning of 05/04.     2.  Coronary artery disease with history of multiple MIs and stents x5.  The patient reports that he had his first MI in 2014 at which point one stent was placed.  Again in 2015 he developed chest pain, was diagnosed with an MI and had another stent placed.  Most recently in 07/2016 he had 3 stents placed for an MI, the details of which vessels were intervened upon are not readily available.  He was seen in a hospital in [place] this last February with chest pain, and an angiogram was done.  He was told that the lesions were not amenable to angioplasty and he was medically managed.  Since that time he states he has not had any recurrence of chest pain.  He has been off Plavix for 2 weeks, stating that he ran out.  Otherwise his medications are pending pharmacy reconciliation.  Will need to continue to hold Plavix and Eliquis for now in anticipation of surgery but would like to resume these as soon as safely possible.  Will monitor on telemetry.     3.  Paroxysmal atrial fibrillation.  The patient states he is chronically on Eliquis.  This will need to be held for his surgery.  It appears he is on a beta blocker which will resume pending pharmacy reconciliation     4.  Depressive disorder, generalized anxiety, posttraumatic stress disorder.  Will resume psychiatric medications as able.     5.  Chronic kidney disease.  The patient's baseline creatinine is unknown.  Today it is 0.87.  Will repeat a BMP in the morning.     6.  Tobacco use disorder.  The patient is a 1 pack a day smoker for the last 40 years.  He is currently not interested in smoking cessation.  He declined the use of a nicotine patch.      DEEP VENOUS THROMBOSIS  PROPHYLAXIS:  PCDs.      CODE STATUS:  Full code.      [facility]  He is in agreement with my assessment and plan of care.         AKASH KUMAR MD       As dictated by SHIRA GARNETT PA-C            D: 2017 10:13   T: 2017 10:45   MT: cw      Name:     ALFREDO LEE   MRN:      [id number]        Account:      [id number]   :      1955           Admitted:     [id number]      Document: [id number]

## 2017-05-05 NOTE — IP AVS SNAPSHOT
"                  MRN:[id number]                      After Visit Summary   5/5/2017    Oswaldo Daniels    MRN: [id number]           Thank you!     Thank you for choosing [facility] for your care. Our goal is always to provide you with excellent care. Hearing back from our patients is one way we can continue to improve our services. Please take a few minutes to complete the written survey that you may receive in the mail after you visit with us. Thank you!        Patient Information     Date Of Birth          1955        Designated Caregiver       Most Recent Value    Caregiver    Will someone help with your care after discharge? yes    Name of designated caregiver Arnoldo [Son, \"to visit for a few days,then back to live with sister\"]    Phone number of caregiver [phone]    Caregiver address see comments [[address] [place] [address]]      About your hospital stay     You were admitted on:  May 5, 2017 You last received care in the:  [facility] [address] [facility]    You were discharged on:  May 7, 2017       Who to Call     For medical emergencies, please call 911.  For non-urgent questions about your medical care, please call your primary care provider or clinic, None  For questions related to your surgery, please call your surgery clinic        Attending Provider     Provider Specialty    Dena Luevano MD Emergency Medicine    Akash Kumar MD Internal Medicine       Primary Care Provider    Physician No Ref-Primary       No address on file        Pending Results     Date and Time Order Name Status Description    5/5/2017 0640 Blood culture Preliminary     5/5/2017 0640 Blood culture Preliminary             Admission Information     Date & Time Provider Department Dept. Phone    5/5/2017 Akash Kumar MD [facility] [address] [phone]      Your Vitals Were     Blood Pressure Pulse Temperature Respirations Height Weight    135/96 92 98  F " "(36.7  C) (Oral) 16 1.778 m (5' 10\") 78.9 kg (174 lb)    Pulse Oximetry BMI (Body Mass Index)                92% 24.97 kg/m2          [facility] Information     [facility] lets you send messages to your doctor, view your test results, renew your prescriptions, schedule appointments and more. To sign up, go to [email or web address] . Click on \"Log in\" on the left side of the screen, which will take you to the Welcome page. Then click on \"Sign up Now\" on the right side of the page.     You will be asked to enter the access code listed below, as well as some personal information. Please follow the directions to create your username and password.     Your access code is: [email or web address]  Expires: 8/3/2017  9:04 AM     Your access code will  in 90 days. If you need help or a new code, please call your [facility] clinic or [phone].        Care EveryWhere ID     This is your Care EveryWhere ID. This could be used by other organizations to access your [facility] medical records  [id number]           Review of your medicines      UNREVIEWED medicines. Ask your doctor about these medicines        Dose / Directions    BUSPIRONE HCL PO        Dose:  10 mg   Take 10 mg by mouth At Bedtime   Refills:  0       CARVEDILOL PO        Dose:  3.125 mg   Take 3.125 mg by mouth every morning Rx is for 3.125mg BID   Refills:  0       * CLONAZEPAM PO        Dose:  0.5 mg   Take 0.5 mg by mouth every morning   Refills:  0       * CLONAZEPAM PO        Dose:  1 mg   Take 1 mg by mouth At Bedtime   Refills:  0       DULOXETINE HCL PO        Dose:  60 mg   Take 60 mg by mouth daily   Refills:  0       ELIQUIS PO        Dose:  5 mg   Take 5 mg by mouth 2 times daily   Refills:  0       MELATONIN PO        Dose:  3 mg   Take 3 mg by mouth At Bedtime   Refills:  0       MIRAPEX PO        Dose:  0.5 mg   Take 0.5 mg by mouth At Bedtime   Refills:  0       MIRTAZAPINE PO        Dose:  30 mg   Take 30 mg by mouth At Bedtime   Refills:  0       " PLAVIX PO        Dose:  75 mg   Take 75 mg by mouth daily   Refills:  0       * Notice:  This list has 2 medication(s) that are the same as other medications prescribed for you. Read the directions carefully, and ask your doctor or other care provider to review them with you.             Protect others around you: Learn how to safely use, store and throw away your medicines at www.disposemymeds.org.             Medication List: This is a list of all your medications and when to take them. Check marks below indicate your daily home schedule. Keep this list as a reference.      Medications           Morning Afternoon Evening Bedtime As Needed    BUSPIRONE HCL PO   Take 10 mg by mouth At Bedtime   Last time this was given:  10 mg on 5/6/2017  9:51 PM                                CARVEDILOL PO   Take 3.125 mg by mouth every morning Rx is for 3.125mg BID   Last time this was given:  3.125 mg on 5/7/2017  9:43 AM                                * CLONAZEPAM PO   Take 0.5 mg by mouth every morning   Last time this was given:  0.5 mg on 5/7/2017  9:44 AM                                * CLONAZEPAM PO   Take 1 mg by mouth At Bedtime   Last time this was given:  0.5 mg on 5/7/2017  9:44 AM                                DULOXETINE HCL PO   Take 60 mg by mouth daily   Last time this was given:  60 mg on 5/7/2017  9:44 AM                                ELIQUIS PO   Take 5 mg by mouth 2 times daily   Last time this was given:  5 mg on 5/7/2017  9:43 AM                                MELATONIN PO   Take 3 mg by mouth At Bedtime   Last time this was given:  3 mg on 5/6/2017  9:51 PM                                MIRAPEX PO   Take 0.5 mg by mouth At Bedtime   Last time this was given:  0.5 mg on 5/6/2017  9:51 PM                                MIRTAZAPINE PO   Take 30 mg by mouth At Bedtime   Last time this was given:  30 mg on 5/6/2017  9:51 PM                                PLAVIX PO   Take 75 mg by mouth daily   Last time this  was given:  75 mg on 5/7/2017  9:44 AM                                * Notice:  This list has 2 medication(s) that are the same as other medications prescribed for you. Read the directions carefully, and ask your doctor or other care provider to review them with you.

## 2017-05-06 ENCOUNTER — ANESTHESIA (OUTPATIENT)
Dept: SURGERY | Facility: CLINIC | Age: 62
End: 2017-05-06
Payer: MEDICAID

## 2017-05-06 LAB
ALBUMIN SERPL-MCNC: 3.1 G/DL (ref 3.4–5)
ALP SERPL-CCNC: 58 U/L (ref 40–150)
ALT SERPL W P-5'-P-CCNC: 21 U/L (ref 0–70)
ANION GAP SERPL CALCULATED.3IONS-SCNC: 7 MMOL/L (ref 3–14)
AST SERPL W P-5'-P-CCNC: 28 U/L (ref 0–45)
BILIRUB SERPL-MCNC: 0.9 MG/DL (ref 0.2–1.3)
BUN SERPL-MCNC: 9 MG/DL (ref 7–30)
CALCIUM SERPL-MCNC: 8.2 MG/DL (ref 8.5–10.1)
CHLORIDE SERPL-SCNC: 111 MMOL/L (ref 94–109)
CO2 SERPL-SCNC: 27 MMOL/L (ref 20–32)
CREAT SERPL-MCNC: 1.03 MG/DL (ref 0.66–1.25)
ERYTHROCYTE [DISTWIDTH] IN BLOOD BY AUTOMATED COUNT: 15.7 % (ref 10–15)
GFR SERPL CREATININE-BSD FRML MDRD: 73 ML/MIN/1.7M2
GLUCOSE SERPL-MCNC: 75 MG/DL (ref 70–99)
HCT VFR BLD AUTO: 42.9 % (ref 40–53)
HGB BLD-MCNC: 14.3 G/DL (ref 13.3–17.7)
MCH RBC QN AUTO: 30.8 PG (ref 26.5–33)
MCHC RBC AUTO-ENTMCNC: 33.3 G/DL (ref 31.5–36.5)
MCV RBC AUTO: 93 FL (ref 78–100)
PLATELET # BLD AUTO: 197 10E9/L (ref 150–450)
POTASSIUM SERPL-SCNC: 4.4 MMOL/L (ref 3.4–5.3)
PROT SERPL-MCNC: 5.9 G/DL (ref 6.8–8.8)
RBC # BLD AUTO: 4.64 10E12/L (ref 4.4–5.9)
SODIUM SERPL-SCNC: 145 MMOL/L (ref 133–144)
WBC # BLD AUTO: 6.8 10E9/L (ref 4–11)

## 2017-05-06 PROCEDURE — 88304 TISSUE EXAM BY PATHOLOGIST: CPT | Mod: 26 | Performed by: SURGERY

## 2017-05-06 PROCEDURE — 99232 SBSQ HOSP IP/OBS MODERATE 35: CPT | Performed by: INTERNAL MEDICINE

## 2017-05-06 PROCEDURE — 25000128 H RX IP 250 OP 636: Performed by: NURSE ANESTHETIST, CERTIFIED REGISTERED

## 2017-05-06 PROCEDURE — 27210794 ZZH OR GENERAL SUPPLY STERILE: Performed by: SURGERY

## 2017-05-06 PROCEDURE — 25000128 H RX IP 250 OP 636: Performed by: ANESTHESIOLOGY

## 2017-05-06 PROCEDURE — 25800025 ZZH RX 258: Performed by: NURSE ANESTHETIST, CERTIFIED REGISTERED

## 2017-05-06 PROCEDURE — 37000009 ZZH ANESTHESIA TECHNICAL FEE, EACH ADDTL 15 MIN: Performed by: SURGERY

## 2017-05-06 PROCEDURE — 25000125 ZZHC RX 250: Performed by: SURGERY

## 2017-05-06 PROCEDURE — 71000012 ZZH RECOVERY PHASE 1 LEVEL 1 FIRST HR: Performed by: SURGERY

## 2017-05-06 PROCEDURE — 47562 LAPAROSCOPIC CHOLECYSTECTOMY: CPT | Performed by: SURGERY

## 2017-05-06 PROCEDURE — 25800025 ZZH RX 258: Performed by: SURGERY

## 2017-05-06 PROCEDURE — 88304 TISSUE EXAM BY PATHOLOGIST: CPT | Performed by: SURGERY

## 2017-05-06 PROCEDURE — 40000170 ZZH STATISTIC PRE-PROCEDURE ASSESSMENT II: Performed by: SURGERY

## 2017-05-06 PROCEDURE — 25000125 ZZHC RX 250: Performed by: ANESTHESIOLOGY

## 2017-05-06 PROCEDURE — 25000566 ZZH SEVOFLURANE, EA 15 MIN: Performed by: SURGERY

## 2017-05-06 PROCEDURE — 25000125 ZZHC RX 250: Performed by: INTERNAL MEDICINE

## 2017-05-06 PROCEDURE — 25000125 ZZHC RX 250: Performed by: NURSE ANESTHETIST, CERTIFIED REGISTERED

## 2017-05-06 PROCEDURE — 27210995 ZZH RX 272: Performed by: SURGERY

## 2017-05-06 PROCEDURE — 25000128 H RX IP 250 OP 636: Performed by: PHYSICIAN ASSISTANT

## 2017-05-06 PROCEDURE — 12000000 ZZH R&B MED SURG/OB

## 2017-05-06 PROCEDURE — 36415 COLL VENOUS BLD VENIPUNCTURE: CPT | Performed by: PHYSICIAN ASSISTANT

## 2017-05-06 PROCEDURE — 25000132 ZZH RX MED GY IP 250 OP 250 PS 637: Performed by: PHYSICIAN ASSISTANT

## 2017-05-06 PROCEDURE — 25800025 ZZH RX 258: Performed by: ANESTHESIOLOGY

## 2017-05-06 PROCEDURE — 0FT44ZZ RESECTION OF GALLBLADDER, PERCUTANEOUS ENDOSCOPIC APPROACH: ICD-10-PCS | Performed by: SURGERY

## 2017-05-06 PROCEDURE — 37000008 ZZH ANESTHESIA TECHNICAL FEE, 1ST 30 MIN: Performed by: SURGERY

## 2017-05-06 PROCEDURE — 36000058 ZZH SURGERY LEVEL 3 EA 15 ADDTL MIN: Performed by: SURGERY

## 2017-05-06 PROCEDURE — 36000056 ZZH SURGERY LEVEL 3 1ST 30 MIN: Performed by: SURGERY

## 2017-05-06 PROCEDURE — 85027 COMPLETE CBC AUTOMATED: CPT | Performed by: PHYSICIAN ASSISTANT

## 2017-05-06 PROCEDURE — 80053 COMPREHEN METABOLIC PANEL: CPT | Performed by: PHYSICIAN ASSISTANT

## 2017-05-06 RX ORDER — SODIUM CHLORIDE, SODIUM LACTATE, POTASSIUM CHLORIDE, CALCIUM CHLORIDE 600; 310; 30; 20 MG/100ML; MG/100ML; MG/100ML; MG/100ML
INJECTION, SOLUTION INTRAVENOUS CONTINUOUS
Status: DISCONTINUED | OUTPATIENT
Start: 2017-05-06 | End: 2017-05-06

## 2017-05-06 RX ORDER — ONDANSETRON 2 MG/ML
INJECTION INTRAMUSCULAR; INTRAVENOUS PRN
Status: DISCONTINUED | OUTPATIENT
Start: 2017-05-06 | End: 2017-05-06

## 2017-05-06 RX ORDER — SODIUM CHLORIDE AND POTASSIUM CHLORIDE 150; 450 MG/100ML; MG/100ML
INJECTION, SOLUTION INTRAVENOUS CONTINUOUS
Status: DISCONTINUED | OUTPATIENT
Start: 2017-05-06 | End: 2017-05-07

## 2017-05-06 RX ORDER — SODIUM CHLORIDE, SODIUM LACTATE, POTASSIUM CHLORIDE, CALCIUM CHLORIDE 600; 310; 30; 20 MG/100ML; MG/100ML; MG/100ML; MG/100ML
INJECTION, SOLUTION INTRAVENOUS CONTINUOUS
Status: DISCONTINUED | OUTPATIENT
Start: 2017-05-06 | End: 2017-05-06 | Stop reason: HOSPADM

## 2017-05-06 RX ORDER — SODIUM CHLORIDE, SODIUM LACTATE, POTASSIUM CHLORIDE, CALCIUM CHLORIDE 600; 310; 30; 20 MG/100ML; MG/100ML; MG/100ML; MG/100ML
INJECTION, SOLUTION INTRAVENOUS CONTINUOUS PRN
Status: DISCONTINUED | OUTPATIENT
Start: 2017-05-06 | End: 2017-05-06

## 2017-05-06 RX ORDER — FENTANYL CITRATE 50 UG/ML
INJECTION, SOLUTION INTRAMUSCULAR; INTRAVENOUS PRN
Status: DISCONTINUED | OUTPATIENT
Start: 2017-05-06 | End: 2017-05-06

## 2017-05-06 RX ORDER — LIDOCAINE HYDROCHLORIDE 20 MG/ML
INJECTION, SOLUTION INFILTRATION; PERINEURAL PRN
Status: DISCONTINUED | OUTPATIENT
Start: 2017-05-06 | End: 2017-05-06

## 2017-05-06 RX ORDER — MAGNESIUM HYDROXIDE 1200 MG/15ML
LIQUID ORAL PRN
Status: DISCONTINUED | OUTPATIENT
Start: 2017-05-06 | End: 2017-05-06 | Stop reason: HOSPADM

## 2017-05-06 RX ORDER — PROPOFOL 10 MG/ML
INJECTION, EMULSION INTRAVENOUS PRN
Status: DISCONTINUED | OUTPATIENT
Start: 2017-05-06 | End: 2017-05-06

## 2017-05-06 RX ORDER — ONDANSETRON 4 MG/1
4 TABLET, ORALLY DISINTEGRATING ORAL EVERY 30 MIN PRN
Status: DISCONTINUED | OUTPATIENT
Start: 2017-05-06 | End: 2017-05-06 | Stop reason: HOSPADM

## 2017-05-06 RX ORDER — ONDANSETRON 2 MG/ML
4 INJECTION INTRAMUSCULAR; INTRAVENOUS EVERY 30 MIN PRN
Status: DISCONTINUED | OUTPATIENT
Start: 2017-05-06 | End: 2017-05-06 | Stop reason: HOSPADM

## 2017-05-06 RX ORDER — EPHEDRINE SULFATE 50 MG/ML
INJECTION, SOLUTION INTRAMUSCULAR; INTRAVENOUS; SUBCUTANEOUS PRN
Status: DISCONTINUED | OUTPATIENT
Start: 2017-05-06 | End: 2017-05-06

## 2017-05-06 RX ORDER — FENTANYL CITRATE 0.05 MG/ML
25-50 INJECTION, SOLUTION INTRAMUSCULAR; INTRAVENOUS
Status: DISCONTINUED | OUTPATIENT
Start: 2017-05-06 | End: 2017-05-06 | Stop reason: HOSPADM

## 2017-05-06 RX ORDER — GLYCOPYRROLATE 0.2 MG/ML
INJECTION, SOLUTION INTRAMUSCULAR; INTRAVENOUS PRN
Status: DISCONTINUED | OUTPATIENT
Start: 2017-05-06 | End: 2017-05-06

## 2017-05-06 RX ORDER — NEOSTIGMINE METHYLSULFATE 1 MG/ML
VIAL (ML) INJECTION PRN
Status: DISCONTINUED | OUTPATIENT
Start: 2017-05-06 | End: 2017-05-06

## 2017-05-06 RX ADMIN — HYDROMORPHONE HYDROCHLORIDE 0.5 MG: 1 INJECTION, SOLUTION INTRAMUSCULAR; INTRAVENOUS; SUBCUTANEOUS at 12:08

## 2017-05-06 RX ADMIN — Medication 5 MG: at 11:16

## 2017-05-06 RX ADMIN — Medication 5 MG: at 10:30

## 2017-05-06 RX ADMIN — ROCURONIUM BROMIDE 10 MG: 10 INJECTION INTRAVENOUS at 10:34

## 2017-05-06 RX ADMIN — CLONAZEPAM 1 MG: 0.5 TABLET ORAL at 21:51

## 2017-05-06 RX ADMIN — DULOXETINE HYDROCHLORIDE 60 MG: 60 CAPSULE, DELAYED RELEASE ORAL at 13:09

## 2017-05-06 RX ADMIN — CLONAZEPAM 0.5 MG: 0.5 TABLET ORAL at 13:09

## 2017-05-06 RX ADMIN — HYDROMORPHONE HYDROCHLORIDE 0.5 MG: 1 INJECTION, SOLUTION INTRAMUSCULAR; INTRAVENOUS; SUBCUTANEOUS at 13:59

## 2017-05-06 RX ADMIN — OXYCODONE HYDROCHLORIDE 5 MG: 5 TABLET ORAL at 16:15

## 2017-05-06 RX ADMIN — FENTANYL CITRATE 50 MCG: 50 INJECTION, SOLUTION INTRAMUSCULAR; INTRAVENOUS at 11:29

## 2017-05-06 RX ADMIN — FENTANYL CITRATE 50 MCG: 50 INJECTION, SOLUTION INTRAMUSCULAR; INTRAVENOUS at 12:06

## 2017-05-06 RX ADMIN — CARVEDILOL 3.12 MG: 3.12 TABLET, FILM COATED ORAL at 08:18

## 2017-05-06 RX ADMIN — HYDROMORPHONE HYDROCHLORIDE 0.5 MG: 1 INJECTION, SOLUTION INTRAMUSCULAR; INTRAVENOUS; SUBCUTANEOUS at 19:13

## 2017-05-06 RX ADMIN — MIRTAZAPINE 30 MG: 15 TABLET, FILM COATED ORAL at 21:51

## 2017-05-06 RX ADMIN — GLYCOPYRROLATE 0.8 MG: 0.2 INJECTION, SOLUTION INTRAMUSCULAR; INTRAVENOUS at 11:29

## 2017-05-06 RX ADMIN — DEXMEDETOMIDINE HYDROCHLORIDE 8 MCG: 100 INJECTION, SOLUTION INTRAVENOUS at 10:56

## 2017-05-06 RX ADMIN — MELATONIN 3 MG: 3 TAB ORAL at 21:51

## 2017-05-06 RX ADMIN — FENTANYL CITRATE 50 MCG: 50 INJECTION, SOLUTION INTRAMUSCULAR; INTRAVENOUS at 10:48

## 2017-05-06 RX ADMIN — NEOSTIGMINE METHYLSULFATE 5 MG: 1 INJECTION INTRAMUSCULAR; INTRAVENOUS; SUBCUTANEOUS at 11:29

## 2017-05-06 RX ADMIN — OXYCODONE HYDROCHLORIDE 10 MG: 5 TABLET ORAL at 23:33

## 2017-05-06 RX ADMIN — PROPOFOL 50 MG: 10 INJECTION, EMULSION INTRAVENOUS at 10:55

## 2017-05-06 RX ADMIN — HYDROMORPHONE HYDROCHLORIDE 0.5 MG: 1 INJECTION, SOLUTION INTRAMUSCULAR; INTRAVENOUS; SUBCUTANEOUS at 21:50

## 2017-05-06 RX ADMIN — MIDAZOLAM HYDROCHLORIDE 2 MG: 1 INJECTION, SOLUTION INTRAMUSCULAR; INTRAVENOUS at 10:17

## 2017-05-06 RX ADMIN — ROCURONIUM BROMIDE 40 MG: 10 INJECTION INTRAVENOUS at 10:21

## 2017-05-06 RX ADMIN — FENTANYL CITRATE 50 MCG: 50 INJECTION, SOLUTION INTRAMUSCULAR; INTRAVENOUS at 12:25

## 2017-05-06 RX ADMIN — SODIUM CHLORIDE, POTASSIUM CHLORIDE, SODIUM LACTATE AND CALCIUM CHLORIDE: 600; 310; 30; 20 INJECTION, SOLUTION INTRAVENOUS at 09:20

## 2017-05-06 RX ADMIN — POTASSIUM CHLORIDE AND SODIUM CHLORIDE: 450; 150 INJECTION, SOLUTION INTRAVENOUS at 13:26

## 2017-05-06 RX ADMIN — FENTANYL CITRATE 100 MCG: 50 INJECTION, SOLUTION INTRAMUSCULAR; INTRAVENOUS at 10:21

## 2017-05-06 RX ADMIN — SODIUM CHLORIDE, POTASSIUM CHLORIDE, SODIUM LACTATE AND CALCIUM CHLORIDE: 600; 310; 30; 20 INJECTION, SOLUTION INTRAVENOUS at 10:17

## 2017-05-06 RX ADMIN — ONDANSETRON 4 MG: 2 INJECTION INTRAMUSCULAR; INTRAVENOUS at 11:13

## 2017-05-06 RX ADMIN — PIPERACILLIN SODIUM,TAZOBACTAM SODIUM 3.38 G: 3; .375 INJECTION, POWDER, FOR SOLUTION INTRAVENOUS at 08:20

## 2017-05-06 RX ADMIN — LIDOCAINE HYDROCHLORIDE 60 MG: 20 INJECTION, SOLUTION INFILTRATION; PERINEURAL at 10:21

## 2017-05-06 RX ADMIN — PRAMIPEXOLE DIHYDROCHLORIDE 0.5 MG: 0.5 TABLET ORAL at 21:51

## 2017-05-06 RX ADMIN — HYDROMORPHONE HYDROCHLORIDE 0.5 MG: 1 INJECTION, SOLUTION INTRAMUSCULAR; INTRAVENOUS; SUBCUTANEOUS at 07:42

## 2017-05-06 RX ADMIN — PIPERACILLIN SODIUM,TAZOBACTAM SODIUM 3.38 G: 3; .375 INJECTION, POWDER, FOR SOLUTION INTRAVENOUS at 03:29

## 2017-05-06 RX ADMIN — DEXMEDETOMIDINE HYDROCHLORIDE 12 MCG: 100 INJECTION, SOLUTION INTRAVENOUS at 10:53

## 2017-05-06 RX ADMIN — BUSPIRONE HYDROCHLORIDE 10 MG: 10 TABLET ORAL at 21:51

## 2017-05-06 ASSESSMENT — ENCOUNTER SYMPTOMS
SEIZURES: 0
DYSRHYTHMIAS: 1

## 2017-05-06 ASSESSMENT — LIFESTYLE VARIABLES: TOBACCO_USE: 1

## 2017-05-06 NOTE — ANESTHESIA POSTPROCEDURE EVALUATION
Patient: Oswaldo Daniels    Procedure(s):  LAPAROSCOPIC CHOLECYSTECTOMY  - Wound Class: II-Clean Contaminated    Diagnosis:CHOLECYSTTIS  Diagnosis Additional Information: No value filed.    Anesthesia Type:  General, RSI, ETT    Note:  Anesthesia Post Evaluation    Patient location during evaluation: PACU  Patient participation: Able to fully participate in evaluation  Level of consciousness: awake and alert  Pain management: satisfactory to patient  Airway patency: patent  Cardiovascular status: hemodynamically stable  Respiratory status: acceptable and unassisted  Hydration status: balanced  PONV: none     Anesthetic complications: None          Last vitals:  Vitals:    05/06/17 1217 05/06/17 1237 05/06/17 1300   BP:  93/62 98/62   Pulse:  64 62   Resp: 10 16 16   Temp:  36.3  C (97.4  F) 36.4  C (97.6  F)   SpO2: 98% 92% 96%         Electronically Signed By: Randy Buenrostro MD  May 6, 2017  1:54 PM

## 2017-05-06 NOTE — ANESTHESIA CARE TRANSFER NOTE
Patient: Oswaldo Daniels    Procedure(s):  LAPAROSCOPIC CHOLECYSTECTOMY  - Wound Class: II-Clean Contaminated    Diagnosis: CHOLECYSTTIS  Diagnosis Additional Information: No value filed.    Anesthesia Type:   General, RSI, ETT     Note:  Airway :Face Mask  Patient transferred to:PACU  Comments: vss      Vitals: (Last set prior to Anesthesia Care Transfer)    CRNA VITALS  5/6/2017 1112 - 5/6/2017 1153      5/6/2017             Resp Rate (set): 10                Electronically Signed By: SAMANTA Baker CRNA  May 6, 2017  11:53 AM

## 2017-05-06 NOTE — ANESTHESIA PREPROCEDURE EVALUATION
Anesthesia Evaluation     . Pt has had prior anesthetic.     No history of anesthetic complications          ROS/MED HX    ENT/Pulmonary:     (+)tobacco use, Current use , . .   (-) sleep apnea   Neurologic:      (-) seizures and CVA   Cardiovascular:     (+) Dyslipidemia, hypertension--CAD, -past MI,-stent,6/2016  5 . Taking blood thinners : . . . :. dysrhythmias a-fib, .       METS/Exercise Tolerance:  >4 METS   Hematologic:         Musculoskeletal:         GI/Hepatic:     (+) cholecystitis/cholelithiasis,      (-) GERD and liver disease   Renal/Genitourinary:     (+) chronic renal disease, type: CRI,       Endo:      (-) Type II DM, thyroid disease and chronic steroid usage   Psychiatric:     (+) psychiatric history anxiety and depression      Infectious Disease:        (-) Recent Fever   Malignancy:         Other:                     Physical Exam  Normal systems: cardiovascular and pulmonary    Airway   Mallampati: II  TM distance: >3 FB  Neck ROM: full    Dental   (+) missing    Cardiovascular       Pulmonary                     Anesthesia Plan      History & Physical Review  History and physical reviewed and following examination; no interval change.    ASA Status:  3 .    NPO Status:  > 8 hours    Plan for General, RSI and ETT with Propofol induction. Maintenance will be Balanced.    PONV prophylaxis:  Ondansetron (or other 5HT-3) and Dexamethasone or Solumedrol  Additional equipment: Videolaryngoscope      Postoperative Care  Postoperative pain management:  IV analgesics.      Consents  Anesthetic plan, risks, benefits and alternatives discussed with:  Patient..                          .

## 2017-05-06 NOTE — OP NOTE
General Surgery Operative Note    PREOPERATIVE DIAGNOSIS:  Cholecystitis.    POSTOPERATIVE DIAGNOSIS:  Cholecystitis.    PROCEDURE:  Laparoscopic Cholecystectomy    SURGEON:  Felice Wahl M.D.    ASSISTANT:  Drea Mejia M.D. [facility]    ANESTHESIA:  General.    BLOOD LOSS: 15 cc    FINDINGS: Cholelithiasis    INDICATIONS:   Mrs. Daniels presented with cholelithiasis and is now presenting for laparoscopic cholecystectomy. I explained the risks, benefits, complications including but not limited to bleeding, infection, possible need to open, possible postop hematoma, seroma, bowel, bladder or bile duct injury, MI, PE, and if any of these occurred the patient would require additional procedures. The patient agreed and did sign consent.    DETAILS OF PROCEDURE: The patient was brought to the operating room per Anesthesia, placed in supine position, and intubated without difficulty. A Surgical timeout was then performed, verifying the correct surgeon, site, procedure, and patient and all in the room were in agreement. 1% lidocaine and 0.25% were injected into infraumbilical area. A skin incision was made and was carried down to the anterior fascia. The fascia was grasped with 2 Kocher's and sharply incised. An open Chata technique was used to get intra-abdominal cavity. The camera was inserted and revealed no trocar injuries. Local was injected to the upper abdomen and 3 5's were placed in the subxiphoid and right upper quadrant under direct visualization. The gallbladder was retracted superiorly and laterally. The gallbladder was minnimaly inflamed. Cautery was used to take down the peritoneal attachments. I was able to delineate the artery and duct and got under the undersurface of the gallbladder to help declinate the duct and artery further. This was taken up high to confirm the critical view on multiple views. There was mild oozing along the liver bed during this dissection but no major bleeding. Once I was  comfortable that there was 1 duct and 1 artery going straight into the gallbladder, I clipped the duct 2 times proximally and 1 distally. This was cut with scissors. The artery was done in a similar manner. The gallbladder was taken off the liver bed with cautery. There was no bile spillage or significant bleeding. The gallbladder was then placed in an Endo catch bag and removed through the umbilical trocar site. The camera was reinserted which showed no bleeding or bile spillage. Copious irrigation were used until clear. Surgicel snow was placed on the raw edge of the wound bed due to to his history of anticoagulation. The wound bed was inspected multiple times showing that it was completely dry and that the clips were in place. The omentum was packed into the perihepatic fossa. All gas was expelled and the trocars were taken out under direct visualization. The fascia closed with 0 Vicryl in figure-of-eight fashion. Marcaine 0.25% were injected to all the wounds. The skin was closed with a 4-0 Monocryl in a subcuticular fashion. Steri-strips and sterile dressings were applied. The patient tolerated the procedure well. There were no complications. They were awoken from anesthesia and transferred to PACU in stable condition. All sponge, instrument and needle counts were correct. The decisions assistant was medically necessary to assist in prepping, positioning, camera operation, retraction/exposure, and closure of the port sites.     GUSTAVO HERBERT MD     Please route or send letter to:  Primary Care Provider (PCP) and Referring Provider

## 2017-05-06 NOTE — PLAN OF CARE
Problem: Goal Outcome Summary  Goal: Goal Outcome Summary  Outcome: No Change  Pt up with SBA.  Lap lula done today.  Sites x4 c/d/i   Tolerating full liquids.   C/o pain upper abd rates 7/10 decreases to 6/10 with dilaudid.  97% 2LNC.  Will wean.

## 2017-05-06 NOTE — BRIEF OP NOTE
[facility] Brief Operative Note    Pre-operative diagnosis: CHOLECYSTTIS   Post-operative diagnosis Cholecystitis   Procedure: Procedure(s):  LAPAROSCOPIC CHOLECYSTECTOMY  - Wound Class: II-Clean Contaminated   Surgeon(s): Surgeon(s) and Role:     * Felice Wahl MD - Primary     * Drea Mejia MD - Resident - Assisting   Estimated blood loss: * No values recorded between 5/6/2017 10:39 AM and 5/6/2017 11:42 AM *    Specimens:   ID Type Source Tests Collected by Time Destination   A : gallbladder and contents  Tissue Gallbladder and Contents SURGICAL PATHOLOGY EXAM Felice Wahl MD 5/6/2017 11:18 AM       Findings: Mild inflammation of the gallbladder, normal anatomy

## 2017-05-06 NOTE — PLAN OF CARE
Problem: Goal Outcome Summary  Goal: Goal Outcome Summary  Outcome: Improving  Afebrile, oxycodone given for incisional pain at lap lula sites. Bandaids on all sites are c/d/i. Voided. Tolerated regular diet. Uses the urinal.

## 2017-05-06 NOTE — PLAN OF CARE
Problem: Goal Outcome Summary  Goal: Goal Outcome Summary  Outcome: No Change  .Patient A/O x4. VSS on RA. IFV 100ml/h.  C/o pain rates 63/10 given dilaudid x2 with relief..Denies SOB, numbness/tingling or nausea. Tolerating clears. Resting between cares. Surgery scheduled for 0930 5/6/17 NPO @ midnight.

## 2017-05-06 NOTE — PROGRESS NOTES
Pt is back in his room after lap lula procedure. Pt c/o of sharp pain, but states that it is different than his pain before and acknowledges it might be from surgery. Pt received dilaudid with some resolution at noon. Incisions show no signs of swelling or drainage. IS encouraged and PCD's on. Pt denies N/V.  mL/hr. Resting between cares.

## 2017-05-06 NOTE — PLAN OF CARE
Problem: Goal Outcome Summary  Goal: Goal Outcome Summary  Outcome: No Change  VSS, tele SR, offered pain meds overnight, pt declined. Denies N/V or any pain. Up independent. NPO since MN for possible surgery this am.

## 2017-05-06 NOTE — PROGRESS NOTES
"Surgery Progress Note    Still with right upper quadrant pain. No fevers or chills. No other complaints.    /72 (BP Location: Right arm)  Pulse 74  Temp 98.1  F (36.7  C) (Temporal)  Resp 12  Ht 5' 10\" (1.778 m)  Wt 174 lb (78.9 kg)  SpO2 98%  BMI 24.97 kg/m2  General- Alert and Oriented.  GI: Abdomen Soft. Mild tenderness to palpation RUQ. No hernias palpated..     A/P-   Plan to proceed with laparoscopic cholecystectomy today. I also discussed the risks associated with the procedure including, but not limited to infection, bleeding, conversion to open, bile leak, bile duct injury, retained gallstones, pneumonia, MI, and anesthesia complications with the patient.  I also discussed if a complication did occur it may require further surgical intervention during or after the procedure. The patient indicated understanding of the discussion, asked appropriate questions, and provided consent    Felice Wahl M.D.  [facility]  [phone]  "

## 2017-05-06 NOTE — PROGRESS NOTES
[facility]    Hospitalist Progress Note    Date of Service (when I saw the patient): 05/06/2017    ASSESSMENT AND PLAN: Oswaldo Daniels is a pleasant 62-year-old male with a past medical history of gallstones, coronary artery disease, multiple myocardial infarctions with stents, hypertension and chronic kidney disease who presented to the Emergency Department with complaints of right upper quadrant pain and nausea. He is found to have a gallstone in the neck of the gallbladder and thus is being admitted to the hospital for further treatment of cholecystitis.      1. Acute cholecystitis. The patient had onset of right upper quadrant pain and nausea beginning yesterday evening. His pain was severe. He has known gallstones and had been admitted at an outside facility approximately 2 weeks ago where this was seen on his ultrasound, and he was referred to surgery but has not yet followed up. CT scan here shows a 1.5 cm gallbladder neck stone. The patient's LFTs are within normal limits as is his lipase level. His white blood cell count is normal. He is afebrile.    General Surgery consult appreciated.  Plan lula today    Will have IV Zosyn, IV fluids.     Will provide pain medications and antiemetics.     Restart Plavix and Eliquis when ok with surgery     2.5  Mild Nypernatremia    Change ivf to 1/2 NS and follow    2. Coronary artery disease with history of multiple MIs and stents x5. The patient reports that he had his first MI in 2014 at which point one stent was placed. Again in 2015 he developed chest pain, was diagnosed with an MI and had another stent placed. Most recently in 07/2016 he had 3 stents placed for an MI, the details of which vessels were intervened upon are not readily available. He was seen in a hospital in [place] this last February with chest pain, and an angiogram was done. He was told that the lesions were not amenable to angioplasty and he was medically managed. Since that time  he states he has not had any recurrence of chest pain. He has been off Plavix for 2 weeks, stating that he ran out. Otherwise his medications are pending pharmacy reconciliation.     Will need to continue to hold Plavix and Eliquis for now in anticipation of surgery    Will monitor on telemetry.      3. Paroxysmal atrial fibrillation. The patient states he is chronically on Eliquis. This will need to be held for his surgery. It appears he is on a beta blocker which will resume pending pharmacy reconciliation      4. Depressive disorder, generalized anxiety, posttraumatic stress disorder. Will resume psychiatric medications as able.      5. Chronic kidney disease. The patient's baseline creatinine is unknown. Stable.  follow      6. Tobacco use disorder. The patient is a 1 pack a day smoker for the last 40 years. He is currently not interested in smoking cessation. He declined the use of a nicotine patch.           DVT Prophylaxis: Pneumatic Compression Devices  Code Status: Full Code    Disposition: Expected discharge in several days once surgery complete.    Akash Kumar MD  Text Page    Interval History   Pt still wth RUQ pain,  No fevers or sweats.  No sob or chest pain.     -Data reviewed today: I reviewed all new labs and imaging results over the last 24 hours. I personally reviewed no images or EKG's today.    Physical Exam   Temp: 97.5  F (36.4  C) Temp src: Oral BP: 120/72 Pulse: 74 Heart Rate: 76 Resp: 16 SpO2: 94 % O2 Device: None (Room air)    Vitals:    05/05/17 0626   Weight: 78.9 kg (174 lb)     Vital Signs with Ranges  Temp:  [97.5  F (36.4  C)-97.9  F (36.6  C)] 97.5  F (36.4  C)  Pulse:  [] 74  Heart Rate:  [76] 76  Resp:  [14-16] 16  BP: ()/(60-88) 120/72  SpO2:  [92 %-95 %] 94 %  I/O last 3 completed shifts:  In: 2070 [P.O.:860; I.V.:1210]  Out: -     Constitutional: No acute distress  Respiratory: CTAB  Cardiovascular: RR  GI: ruq tenderness without change  Skin/Integumen: no  rash  Other:  no edema    Medications     lactated ringers 25 mL/hr at 05/06/17 0920     no pre procedure antibiotic needed       NaCl 100 mL/hr at 05/06/17 0000       sodium chloride (PF)  3 mL Intracatheter Q8H     [Auto Hold] sodium chloride (PF)  3 mL Intracatheter Q8H     [Auto Hold] piperacillin-tazobactam  3.375 g Intravenous Q6H     [Auto Hold] DULoxetine (CYMBALTA) EC capsule 60 mg  60 mg Oral Daily     [Auto Hold] melatonin tablet 3 mg  3 mg Oral At Bedtime     [Auto Hold] mirtazapine (REMERON) tablet 30 mg  30 mg Oral At Bedtime     [Auto Hold] pramipexole  0.5 mg Oral At Bedtime     [Auto Hold] clonazePAM (klonoPIN) tablet 1 mg  1 mg Oral At Bedtime     [Auto Hold] clonazePAM (klonoPIN) tablet 0.5 mg  0.5 mg Oral QAM     [Auto Hold] carvedilol (COREG) tablet 3.125 mg  3.125 mg Oral QAM     [Auto Hold] busPIRone  10 mg Oral At Bedtime       Data     Recent Labs  Lab 05/06/17  0723 05/05/17  0656   WBC 6.8 9.3   HGB 14.3 16.6   MCV 93 89    265   INR  --  0.99   * 139   POTASSIUM 4.4 3.7   CHLORIDE 111* 105   CO2 27 25   BUN 9 16   CR 1.03 0.87   ANIONGAP 7 9   LORENZA 8.2* 9.5   GLC 75 116*   ALBUMIN 3.1* 4.3   PROTTOTAL 5.9* 8.0   BILITOTAL 0.9 0.9   ALKPHOS 58 82   ALT 21 24   AST 28 32   LIPASE  --  143   TROPI  --  0.033       Imaging:  No results found for this or any previous visit (from the past 24 hour(s)).

## 2017-05-07 VITALS
DIASTOLIC BLOOD PRESSURE: 96 MMHG | TEMPERATURE: 98 F | OXYGEN SATURATION: 92 % | HEIGHT: 70 IN | RESPIRATION RATE: 16 BRPM | SYSTOLIC BLOOD PRESSURE: 135 MMHG | HEART RATE: 92 BPM | BODY MASS INDEX: 24.91 KG/M2 | WEIGHT: 174 LBS

## 2017-05-07 LAB
ANION GAP SERPL CALCULATED.3IONS-SCNC: 8 MMOL/L (ref 3–14)
BUN SERPL-MCNC: 4 MG/DL (ref 7–30)
CALCIUM SERPL-MCNC: 8.3 MG/DL (ref 8.5–10.1)
CHLORIDE SERPL-SCNC: 106 MMOL/L (ref 94–109)
CO2 SERPL-SCNC: 25 MMOL/L (ref 20–32)
CREAT SERPL-MCNC: 0.77 MG/DL (ref 0.66–1.25)
ERYTHROCYTE [DISTWIDTH] IN BLOOD BY AUTOMATED COUNT: 15.5 % (ref 10–15)
GFR SERPL CREATININE-BSD FRML MDRD: ABNORMAL ML/MIN/1.7M2
GLUCOSE SERPL-MCNC: 88 MG/DL (ref 70–99)
HCT VFR BLD AUTO: 42.7 % (ref 40–53)
HGB BLD-MCNC: 14.3 G/DL (ref 13.3–17.7)
MCH RBC QN AUTO: 31 PG (ref 26.5–33)
MCHC RBC AUTO-ENTMCNC: 33.5 G/DL (ref 31.5–36.5)
MCV RBC AUTO: 93 FL (ref 78–100)
PLATELET # BLD AUTO: 176 10E9/L (ref 150–450)
POTASSIUM SERPL-SCNC: 3.9 MMOL/L (ref 3.4–5.3)
RBC # BLD AUTO: 4.61 10E12/L (ref 4.4–5.9)
SODIUM SERPL-SCNC: 139 MMOL/L (ref 133–144)
WBC # BLD AUTO: 8.9 10E9/L (ref 4–11)

## 2017-05-07 PROCEDURE — 25000132 ZZH RX MED GY IP 250 OP 250 PS 637: Performed by: SURGERY

## 2017-05-07 PROCEDURE — 25000132 ZZH RX MED GY IP 250 OP 250 PS 637: Performed by: PHYSICIAN ASSISTANT

## 2017-05-07 PROCEDURE — 25000125 ZZHC RX 250: Performed by: INTERNAL MEDICINE

## 2017-05-07 PROCEDURE — 99239 HOSP IP/OBS DSCHRG MGMT >30: CPT | Performed by: INTERNAL MEDICINE

## 2017-05-07 PROCEDURE — 85027 COMPLETE CBC AUTOMATED: CPT | Performed by: SURGERY

## 2017-05-07 PROCEDURE — 80048 BASIC METABOLIC PNL TOTAL CA: CPT | Performed by: SURGERY

## 2017-05-07 PROCEDURE — 36415 COLL VENOUS BLD VENIPUNCTURE: CPT | Performed by: SURGERY

## 2017-05-07 RX ORDER — CLOPIDOGREL BISULFATE 75 MG/1
75 TABLET ORAL DAILY
Status: DISCONTINUED | OUTPATIENT
Start: 2017-05-07 | End: 2017-05-07 | Stop reason: HOSPADM

## 2017-05-07 RX ADMIN — LIDOCAINE HYDROCHLORIDE 10 ML: 20 JELLY TOPICAL at 00:00

## 2017-05-07 RX ADMIN — CARVEDILOL 3.12 MG: 3.12 TABLET, FILM COATED ORAL at 09:43

## 2017-05-07 RX ADMIN — OXYCODONE HYDROCHLORIDE 10 MG: 5 TABLET ORAL at 06:16

## 2017-05-07 RX ADMIN — POTASSIUM CHLORIDE AND SODIUM CHLORIDE: 450; 150 INJECTION, SOLUTION INTRAVENOUS at 00:53

## 2017-05-07 RX ADMIN — CLOPIDOGREL BISULFATE 75 MG: 75 TABLET, FILM COATED ORAL at 09:44

## 2017-05-07 RX ADMIN — SENNOSIDES AND DOCUSATE SODIUM 1 TABLET: 8.6; 5 TABLET ORAL at 09:44

## 2017-05-07 RX ADMIN — CLONAZEPAM 0.5 MG: 0.5 TABLET ORAL at 09:44

## 2017-05-07 RX ADMIN — OXYCODONE HYDROCHLORIDE 10 MG: 5 TABLET ORAL at 09:51

## 2017-05-07 RX ADMIN — APIXABAN 5 MG: 5 TABLET, FILM COATED ORAL at 09:43

## 2017-05-07 RX ADMIN — OXYCODONE HYDROCHLORIDE 10 MG: 5 TABLET ORAL at 02:57

## 2017-05-07 RX ADMIN — DULOXETINE HYDROCHLORIDE 60 MG: 60 CAPSULE, DELAYED RELEASE ORAL at 09:44

## 2017-05-07 NOTE — PROGRESS NOTES
"Pt A&O x4. /98, heart rate at 107. Pt admitted to me that he needed to get home to search for his valuables he left at his son's house. Son had some \"sketchy\" friends over and now son cannot find Pts electronics or \"expensive valuables.\" He is experiencing a great amount of stress as evidenced by his BP and HR. Pt caught trying to leave to go have a smoke. Pt was told that hospitalist would be here to see him soon and pt returned to room upset. Pt spoke to hospitalist and said that he understood the risks of leaving AMA. He has decided to leave as soon as possible to go \"take care of important business that cannot wait another day.\"  "

## 2017-05-07 NOTE — PLAN OF CARE
Problem: Goal Outcome Summary  Goal: Goal Outcome Summary  Outcome: No Change  Pt AOx4. VSS on RA. C/O pain 7/10 in RUQ. PRN oxy administered x2. Pt reporting minimal effectiveness. No BM, is not passing flatus, hypoactive bowel sounds. Pt ambulated x1 this shift. Pt reported urinary retention beginning of shift, bladder scan 550 ml, straight cathed for 750 ml. Pt with 150 ml out since catheterization. Lap sites CDI. IVF at 100ml/hr. Tele NSR Will continue to monitor.

## 2017-05-07 NOTE — PROGRESS NOTES
"Surgery Progress Note         Assessment and Plan:   Assessment:   61 yo M w/ PMH of a fib on anticoagulation, MI, HTN, CKD presented with acute cholecystitis now POD#1 s/p laparoscopic cholecystectomy. Doing well.      Plan:   - OK to restart anticoagulation (ordered pta plavix, apixaban)  - Regular diet  - DC IVF  - Pain: PRN tylenol, oxycodone. PRN dilaudid available if needed, avoid if possible  - Encourage ambulation    Dispo: If pain is controlled on PO meds ok to discharge from surgical perspective. F/U in 2 weeks with Dr. Tripp.            Interval History:   Patient is doing well with satisfactory progress.  Tolerated a regular diet for dinner last night, denies nausea/emesis. Mild increase in pain this morning with transition to PO meds, will see how it goes this morning.  Initial urinary retention in evening, voiding spontaneously.         Physical Exam:     Gen:  This is a well developed, well nourished male in no apparent distress.  Blood pressure 127/87, pulse 87, temperature 98  F (36.7  C), temperature source Oral, resp. rate 16, height 1.778 m (5' 10\"), weight 78.9 kg (174 lb), SpO2 92 %.    Lungs - Non labored breathing on room air  Heart - Regular rate   Abdomen: soft, non distended. Mild TTP. Port sites- umbilical site with surrounding ecchymosis. All sites with steri strips/bandaids, no erythema/drainage  Extremities - warm without edema  Neurologic - nonfocal          Data:     WBC   Date Value Ref Range Status   05/07/2017 8.9 4.0 - 11.0 10e9/L Final        Drea Mejia MD  General Surgery Resident, PGY4  (P) [phone]           "

## 2017-05-07 NOTE — PLAN OF CARE
Problem: Goal Outcome Summary  Goal: Goal Outcome Summary  Pt is AOx4, VSS on RA. Regular diet. Pain is tolerable with Oxycodone. Pt ate a regular lunch and went for walk, admitted to going out for smoke, had emesis on return to room see note, MD notified. MD to reassess after dinner for possible discharge.

## 2017-05-07 NOTE — PROGRESS NOTES
[facility]    Hospitalist Progress Note    Date of Service (when I saw the patient): 05/07/2017    ASSESSMENT AND PLAN: Oswaldo Daniels is a pleasant 62-year-old male with a past medical history of gallstones, coronary artery disease, multiple myocardial infarctions with stents, hypertension and chronic kidney disease who presented to the Emergency Department with complaints of right upper quadrant pain and nausea. He is found to have a gallstone in the neck of the gallbladder and thus is being admitted to the hospital for further treatment of cholecystitis.      1. Acute cholecystitis. The patient had onset of right upper quadrant pain and nausea beginning yesterday evening. His pain was severe. He has known gallstones and had been admitted at an outside facility approximately 2 weeks ago where this was seen on his ultrasound, and he was referred to surgery but has not yet followed up. CT scan here shows a 1.5 cm gallbladder neck stone. The patient's LFTs are within normal limits as is his lipase level. His white blood cell count is normal. He is afebrile.    S/P lap lula yesterday.      Ate lunch and now with nausea and vomiting.    Ok stop antibiotics     Will provide pain medications and antiemetics.     Restart Plavix and Eliquis tomorrow.     2.5  Mild Nypernatremia    Resolved with ivf    2. Coronary artery disease with history of multiple MIs and stents x5. The patient reports that he had his first MI in 2014 at which point one stent was placed. Again in 2015 he developed chest pain, was diagnosed with an MI and had another stent placed. Most recently in 07/2016 he had 3 stents placed for an MI, the details of which vessels were intervened upon are not readily available. He was seen in a hospital in [place] this last February with chest pain, and an angiogram was done. He was told that the lesions were not amenable to angioplasty and he was medically managed. Since that time he states he has  not had any recurrence of chest pain. He has been off Plavix for 2 weeks, stating that he ran out. Otherwise his medications are pending pharmacy reconciliation.     Restart Plavix and Eliquis tomorrow.    Will monitor on telemetry.      3. Paroxysmal atrial fibrillation. The patient states he is chronically on Eliquis. This will need to be held for his surgery. It appears he is on a beta blocker which will resume pending pharmacy reconciliation      4. Depressive disorder, generalized anxiety, posttraumatic stress disorder. Will resume psychiatric medications as able.      5. Chronic kidney disease. The patient's baseline creatinine is unknown. Stable.  follow      6. Tobacco use disorder. The patient is a 1 pack a day smoker for the last 40 years. He is currently not interested in smoking cessation. He declined the use of a nicotine patch.           DVT Prophylaxis: Pneumatic Compression Devices  Code Status: Full Code    Disposition: with nausea and vomiting after eating lunch will hold one more day.    Akash Kumar MD  Text Page    Interval History   States he feels fine.  Pain is manageable.  Awaiting lunch.  No sob or cp    -Data reviewed today: I reviewed all new labs and imaging results over the last 24 hours. I personally reviewed no images or EKG's today.    Physical Exam   Temp: 98  F (36.7  C) Temp src: Oral BP: (!) 154/104 Pulse: 92 Heart Rate: 104 Resp: 16 SpO2: 92 % O2 Device: None (Room air) Oxygen Delivery: 2 LPM  Vitals:    05/05/17 0626   Weight: 78.9 kg (174 lb)     Vital Signs with Ranges  Temp:  [97.6  F (36.4  C)-98.2  F (36.8  C)] 98  F (36.7  C)  Pulse:  [61-92] 92  Heart Rate:  [] 104  Resp:  [16-18] 16  BP: ()/() 154/104  SpO2:  [92 %-99 %] 92 %  I/O last 3 completed shifts:  In: 2597 [P.O.:520; I.V.:2077]  Out: 1375 [Urine:1375]    Constitutional: No acute distress, sitting upright in bed.   Respiratory: CTAB  Cardiovascular: RR  GI: ruq tenderness without  change  Skin/Integumen: no rash  Other:  no edema    Medications        apixaban ANTICOAGULANT  5 mg Oral BID     clopidogrel  75 mg Oral Daily     sodium chloride (PF)  3 mL Intracatheter Q8H     DULoxetine (CYMBALTA) EC capsule 60 mg  60 mg Oral Daily     melatonin tablet 3 mg  3 mg Oral At Bedtime     mirtazapine (REMERON) tablet 30 mg  30 mg Oral At Bedtime     pramipexole  0.5 mg Oral At Bedtime     clonazePAM (klonoPIN) tablet 1 mg  1 mg Oral At Bedtime     clonazePAM (klonoPIN) tablet 0.5 mg  0.5 mg Oral QAM     carvedilol (COREG) tablet 3.125 mg  3.125 mg Oral QAM     busPIRone  10 mg Oral At Bedtime       Data     Recent Labs  Lab 05/07/17  0751 05/06/17  0723 05/05/17  0656   WBC 8.9 6.8 9.3   HGB 14.3 14.3 16.6   MCV 93 93 89    197 265   INR  --   --  0.99    145* 139   POTASSIUM 3.9 4.4 3.7   CHLORIDE 106 111* 105   CO2 25 27 25   BUN 4* 9 16   CR 0.77 1.03 0.87   ANIONGAP 8 7 9   LORENZA 8.3* 8.2* 9.5   GLC 88 75 116*   ALBUMIN  --  3.1* 4.3   PROTTOTAL  --  5.9* 8.0   BILITOTAL  --  0.9 0.9   ALKPHOS  --  58 82   ALT  --  21 24   AST  --  28 32   LIPASE  --   --  143   TROPI  --   --  0.033       Imaging:  No results found for this or any previous visit (from the past 24 hour(s)).

## 2017-05-07 NOTE — PROGRESS NOTES
IM Addendum  Pt leaving AMA.  Explained risks to patient of leaving which include increase pain, bleeding (could be life threatening on meds), vomiting, disruption of surgical repair.  Understanding and wishes to leave AMA.    Akash Kumar

## 2017-05-07 NOTE — PROGRESS NOTES
Pt requesting to leaving immediately.   Dr. Kumar notified, came to speak with pt. Requesting to stay to see if he tolerates dinner.   Pt refuses, wants to leave now.  Informed it would be AMA, pt acknowledges.  Dr. Wahl updated on situation by writer.   Told pt to f/u with Dr. Wahl in 2 weeks, pt given number to make appt.  Given information on lap lula instructions.  Reviewed s/s of when to call MD or return to hospital.   Scripts for 10 days of plavix given to pt as he ran out 2 weeks ago.  Pt requested pain medications,  Informed pt that pain meds would not be prescribed when leaving AMA.  Iv out, tele off.  Pt's son Arnoldo in room and verbalized understanding of all instructions.  Pt signed AMA paperwork.   Pt escorted home by son.

## 2017-05-07 NOTE — PROVIDER NOTIFICATION
Pt returned from walk (admitted to going outside to smoke)  Writer noted Hr 130's on tele monitor.  Assessed pt, pt had emesis in BR 150cc liquid/undigested food.   Pt states he ate too fast and nausea resolved after emesis.  Pt back to bed.  BP elevated 163/114  -130.  Pt denied SOB/chest pain/dizziness.  MD updated.  Pt to stay another night.  Informed pt,  Pt insisting on leaving today stating he has something very personal to deal with at home and will not elaborate.  Pt states he is not willed to stay another night.   MD updated- to see pt again.

## 2017-05-07 NOTE — DISCHARGE SUMMARY
[facility]    Discharge Summary  Hospitalist    Date of Admission:  5/5/2017  Date of Discharge:  5/7/17.   Pt signed AMA discharge  Discharging Provider: Akash Kumar MD  Date of Service (when I saw the patient): 05/07/17    Discharge Diagnoses   Acute cholecystitis and cholelithiasis    History of Present Illness   Oswaldo Daniels is an 62 year old male who presented with recurrent ruq pain with know gallstone.  Diagnosed with cholecystitis.    Hospital Course   Oswaldo Daniels was admitted on 5/5/2017.  The following problems were addressed during his hospitalization:    ASSESSMENT AND PLAN: Oswaldo Daniels is a pleasant 62-year-old male with a past medical history of gallstones, coronary artery disease, multiple myocardial infarctions with stents, hypertension and chronic kidney disease who presented to the Emergency Department with complaints of right upper quadrant pain and nausea. He is found to have a gallstone in the neck of the gallbladder and thus is being admitted to the hospital for further treatment of cholecystitis.       1. Acute cholecystitis. The patient had onset of right upper quadrant pain and nausea.. His pain was severe. He has known gallstones and had been admitted at an outside facility approximately 2 weeks ago where this was seen on his ultrasound, and he was referred to surgery but has not yet followed up. CT scan here shows a 1.5 cm gallbladder neck stone. The patient's LFTs are within normal limits as is his lipase level. His white blood cell count is normal. He is afebrile.    S/P lap lula yesterday.     Ate lunch and now with nausea and vomiting.    Ok stop antibiotics     Will provide pain medications and antiemetics.     Restart Plavix and Eliquis tomorrow.      2.5 Mild Nypernatremia    Resolved with ivf     2. Coronary artery disease with history of multiple MIs and stents x5. The patient reports that he had his first MI in 2014 at which point one stent  was placed. Again in 2015 he developed chest pain, was diagnosed with an MI and had another stent placed. Most recently in 07/2016 he had 3 stents placed for an MI, the details of which vessels were intervened upon are not readily available. He was seen in a hospital in [place] this last February with chest pain, and an angiogram was done. He was told that the lesions were not amenable to angioplasty and he was medically managed. Since that time he states he has not had any recurrence of chest pain. He has been off Plavix for 2 weeks, stating that he ran out. Otherwise his medications are pending pharmacy reconciliation.     Restart Plavix and Eliquis tomorrow.    Will monitor on telemetry.       3. Paroxysmal atrial fibrillation. The patient states he is chronically on Eliquis. This will need to be held for his surgery. It appears he is on a beta blocker which will resume pending pharmacy reconciliation       4. Depressive disorder, generalized anxiety, posttraumatic stress disorder. Will resume psychiatric medications as able.       5. Chronic kidney disease. The patient's baseline creatinine is unknown. Stable. follow      6. Tobacco use disorder. The patient is a 1 pack a day smoker for the last 40 years. He is currently not interested in smoking cessation. He declined the use of a nicotine patch.     Akash Kumar MD    Significant Results and Procedures   above    Pending Results   These results will be followed up by primary care physician.    Unresulted Labs Ordered in the Past 30 Days of this Admission     Date and Time Order Name Status Description    5/5/2017 0640 Blood culture Preliminary     5/5/2017 0640 Blood culture Preliminary           Code Status   Full Code       Primary Care Physician   Physician No Ref-Primary    Physical Exam   Temp: 98  F (36.7  C) Temp src: Oral BP: (!) 135/96 Pulse: 92 Heart Rate: 102 Resp: 16 SpO2: 92 % O2 Device: None (Room air) Oxygen Delivery: 2 LPM  Vitals:     05/05/17 0626   Weight: 78.9 kg (174 lb)     Vital Signs with Ranges  Temp:  [97.9  F (36.6  C)-98.2  F (36.8  C)] 98  F (36.7  C)  Pulse:  [82-92] 92  Heart Rate:  [] 102  Resp:  [16-18] 16  BP: (105-163)/() 135/96  SpO2:  [92 %-99 %] 92 %  I/O last 3 completed shifts:  In: 2597 [P.O.:520; I.V.:2077]  Out: 1375 [Urine:1375]        Discharge Disposition   Discharged to home  Condition at discharge: Fair    Consultations This Hospital Stay   SURGERY GENERAL IP CONSULT    Time Spent on this Encounter   IAkash, personally saw the patient today and spent greater than 30 minutes discharging this patient.    Discharge Orders   No discharge procedures on file.  Discharge Medications   Current Discharge Medication List      CONTINUE these medications which have NOT CHANGED    Details   Clopidogrel Bisulfate (PLAVIX PO) Take 75 mg by mouth daily       Apixaban (ELIQUIS PO) Take 5 mg by mouth 2 times daily       MELATONIN PO Take 3 mg by mouth At Bedtime       CARVEDILOL PO Take 3.125 mg by mouth every morning Rx is for 3.125mg BID      DULOXETINE HCL PO Take 60 mg by mouth daily      !! CLONAZEPAM PO Take 0.5 mg by mouth every morning      !! CLONAZEPAM PO Take 1 mg by mouth At Bedtime      BUSPIRONE HCL PO Take 10 mg by mouth At Bedtime       MIRTAZAPINE PO Take 30 mg by mouth At Bedtime      Pramipexole Dihydrochloride (MIRAPEX PO) Take 0.5 mg by mouth At Bedtime       !! - Potential duplicate medications found. Please discuss with provider.      STOP taking these medications       pramipexole dihydrochloride (MIRAPEX) 0.375 MG TB24 24 hr tablet Comments:   Reason for Stopping:             Allergies   No Known Allergies  Data   Most Recent 3 CBC's:  Recent Labs   Lab Test  05/07/17   0751  05/06/17   0723  05/05/17   0656   WBC  8.9  6.8  9.3   HGB  14.3  14.3  16.6   MCV  93  93  89   PLT  176  197  265      Most Recent 3 BMP's:  Recent Labs   Lab Test  05/07/17   0751  05/06/17   0723  05/05/17    0656   NA  139  145*  139   POTASSIUM  3.9  4.4  3.7   CHLORIDE  106  111*  105   CO2  25  27  25   BUN  4*  9  16   CR  0.77  1.03  0.87   ANIONGAP  8  7  9   LORENZA  8.3*  8.2*  9.5   GLC  88  75  116*     Most Recent 2 LFT's:  Recent Labs   Lab Test  05/06/17   0723  05/05/17   0656   AST  28  32   ALT  21  24   ALKPHOS  58  82   BILITOTAL  0.9  0.9     Most Recent INR's and Anticoagulation Dosing History:  Anticoagulation Dose History     Recent Dosing and Labs Latest Ref Rng & Units 5/5/2017    INR 0.86 - 1.14 0.99        Most Recent 3 Troponin's:  Recent Labs   Lab Test  05/05/17   0656   TROPI  0.033     Most Recent Cholesterol Panel:No lab results found.  Most Recent 6 Bacteria Isolates From Any Culture (See EPIC Reports for Culture Details):  Recent Labs   Lab Test  05/05/17   0810  05/05/17   0656   CULT  No growth after 2 days  No growth after 2 days     Most Recent TSH, T4 and A1c Labs:No lab results found.  Results for orders placed or performed during the hospital encounter of 05/05/17   CT Abdomen Pelvis w Contrast    Narrative    CT ABDOMEN/PELVIS WITH CONTRAST May 5, 2017 8:27 AM     HISTORY: Check for cholecystitis, small bowel obstruction,  appendicitis or ureteral calculus. RIght abdomen pain and vomiting.    CONTRAST DOSE: 88mL [id number].    TECHNIQUE: Radiation dose for this scan was reduced using automated  exposure control, adjustment of the mA and/or kV according to patient  size, or iterative reconstruction technique.    FINDINGS: 1.5 cm gallbladder neck stone is demonstrated. There is  probable hepatic fatty infiltration. The spleen, adrenal glands,  kidneys, and pancreas appear within normal limits. There is no  evidence of bowel obstruction. No pericolonic inflammatory stranding.  There is a right inguinal hernia containing bowel, likely a portion of  the cecum. Pelvic contents are unremarkable.      Impression    IMPRESSION:  1. 1.5 cm gallbladder neck stone.  2. Right inguinal hernia  containing a small portion of the cecum.  There is no adjacent inflammatory stranding.  3. No other evidence of an acute inflammatory process in the abdomen  or pelvis.  4. Hepatic fatty infiltration, possible etiologies include consumption  of alcohol or excessive carbohydrate intake, especially  sugar/fructose. Metabolic syndrome commonly occurs in combination with  nonalcoholic fatty liver disease. Although often reversible,  nonalcoholic fatty liver disease can progress to steatohepatitis and  cirrhosis.     SHIRLEY OLSON MD

## 2017-05-09 LAB — COPATH REPORT: NORMAL

## 2017-05-11 LAB
BACTERIA SPEC CULT: NO GROWTH
BACTERIA SPEC CULT: NO GROWTH
Lab: NORMAL
Lab: NORMAL
MICRO REPORT STATUS: NORMAL
MICRO REPORT STATUS: NORMAL
SPECIMEN SOURCE: NORMAL
SPECIMEN SOURCE: NORMAL

## (undated) DEVICE — GLOVE PROTEXIS W/NEU-THERA 7.5  2D73TE75

## (undated) DEVICE — ESU GROUND PAD UNIVERSAL W/O CORD

## (undated) DEVICE — SUCTION IRR STRYKERFLOW II W/TIP 250-070-520

## (undated) DEVICE — SOL NACL 0.9% INJ 1000ML BAG 2B1324X

## (undated) DEVICE — SURGICEL ABSORBABLE HEMOSTAT SNOW 2"X4" 2082

## (undated) DEVICE — ESU CORD MONOPOLAR 10'  E0510

## (undated) DEVICE — ENDO TROCAR FIRST ENTRY KII FIOS Z-THRD 05X100MM CTF03

## (undated) DEVICE — ENDO TROCAR OPTICAL 05MM VERSAPORT PLUS W/FIX CAN ONB5STF

## (undated) DEVICE — SU VICRYL 0 UR-6 27" J603H

## (undated) DEVICE — ENDO TROCAR OPTICAL 12MM VERSAPORT PLUS W/FIX CAN ONB12STF

## (undated) DEVICE — PREP CHLORAPREP 26ML TINTED ORANGE  260815

## (undated) DEVICE — PACK LAP CHOLE SLC15LCFSD

## (undated) DEVICE — GLOVE GAMMEX DERMAPRENE ULTRA SZ 8 LF 8516

## (undated) DEVICE — CLIP APPLIER ENDO 5MM M/L LIGAMAX EL5ML

## (undated) DEVICE — SU MONOCRYL 4-0 PS-2 18" UND Y496G

## (undated) DEVICE — SUCTION CANISTER MEDIVAC LINER 3000ML W/LID 65651-530

## (undated) DEVICE — SOL NACL 0.9% IRRIG 1000ML BOTTLE 07138-09

## (undated) DEVICE — ESU HOLDER LAP INST DISP PURPLE LONG 330MM H-PRO-330

## (undated) DEVICE — ENDO POUCH GOLD 10MM ECATCH 173050G

## (undated) DEVICE — LINEN TOWEL PACK X5 5464

## (undated) RX ORDER — GLYCOPYRROLATE 0.2 MG/ML
INJECTION, SOLUTION INTRAMUSCULAR; INTRAVENOUS
Status: DISPENSED
Start: 2017-05-06

## (undated) RX ORDER — BUPIVACAINE HYDROCHLORIDE AND EPINEPHRINE 2.5; 5 MG/ML; UG/ML
INJECTION, SOLUTION EPIDURAL; INFILTRATION; INTRACAUDAL; PERINEURAL
Status: DISPENSED
Start: 2017-05-06

## (undated) RX ORDER — FENTANYL CITRATE 50 UG/ML
INJECTION, SOLUTION INTRAMUSCULAR; INTRAVENOUS
Status: DISPENSED
Start: 2017-05-06

## (undated) RX ORDER — HYDROMORPHONE HYDROCHLORIDE 1 MG/ML
INJECTION, SOLUTION INTRAMUSCULAR; INTRAVENOUS; SUBCUTANEOUS
Status: DISPENSED
Start: 2017-05-06

## (undated) RX ORDER — ONDANSETRON 2 MG/ML
INJECTION INTRAMUSCULAR; INTRAVENOUS
Status: DISPENSED
Start: 2017-05-06

## (undated) RX ORDER — LIDOCAINE HYDROCHLORIDE 10 MG/ML
INJECTION, SOLUTION EPIDURAL; INFILTRATION; INTRACAUDAL; PERINEURAL
Status: DISPENSED
Start: 2017-05-06